# Patient Record
Sex: MALE | Race: WHITE | NOT HISPANIC OR LATINO | Employment: OTHER | URBAN - METROPOLITAN AREA
[De-identification: names, ages, dates, MRNs, and addresses within clinical notes are randomized per-mention and may not be internally consistent; named-entity substitution may affect disease eponyms.]

---

## 2022-03-15 ENCOUNTER — APPOINTMENT (INPATIENT)
Dept: RADIOLOGY | Facility: HOSPITAL | Age: 62
DRG: 481 | End: 2022-03-15
Payer: COMMERCIAL

## 2022-03-15 ENCOUNTER — APPOINTMENT (EMERGENCY)
Dept: RADIOLOGY | Facility: HOSPITAL | Age: 62
DRG: 481 | End: 2022-03-15
Payer: COMMERCIAL

## 2022-03-15 ENCOUNTER — HOSPITAL ENCOUNTER (INPATIENT)
Facility: HOSPITAL | Age: 62
LOS: 7 days | DRG: 481 | End: 2022-03-22
Attending: STUDENT IN AN ORGANIZED HEALTH CARE EDUCATION/TRAINING PROGRAM | Admitting: STUDENT IN AN ORGANIZED HEALTH CARE EDUCATION/TRAINING PROGRAM
Payer: COMMERCIAL

## 2022-03-15 DIAGNOSIS — S72.402A CLOSED FRACTURE OF DISTAL END OF LEFT FEMUR, UNSPECIFIED FRACTURE MORPHOLOGY, INITIAL ENCOUNTER (HCC): ICD-10-CM
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
## 2022-03-15 PROBLEM — S70.01XA HEMATOMA OF RIGHT HIP: Status: ACTIVE | Noted: 2022-03-15

## 2022-03-15 PROBLEM — G89.11 ACUTE PAIN DUE TO TRAUMA: Status: ACTIVE | Noted: 2022-03-15

## 2022-03-15 PROBLEM — S70.02XA HIP HEMATOMA, LEFT: Status: ACTIVE | Noted: 2022-03-15

## 2022-03-15 PROBLEM — G89.29 CHRONIC PAIN: Status: ACTIVE | Noted: 2022-03-15

## 2022-03-15 PROBLEM — S30.1XXA ABDOMINAL WALL CONTUSION: Status: ACTIVE | Noted: 2022-03-15

## 2022-03-15 PROBLEM — T07.XXXA ABRASIONS OF MULTIPLE SITES: Status: ACTIVE | Noted: 2022-03-15

## 2022-03-15 PROBLEM — S72.92XA FEMUR FRACTURE, LEFT (HCC): Status: ACTIVE | Noted: 2022-03-15

## 2022-03-15 PROBLEM — S22.43XA CLOSED FRACTURE OF MULTIPLE RIBS OF BOTH SIDES: Status: ACTIVE | Noted: 2022-03-15

## 2022-03-15 PROBLEM — S22.20XA STERNAL FRACTURE WITH RETROSTERNAL CONTUSION, CLOSED, INITIAL ENCOUNTER: Status: ACTIVE | Noted: 2022-03-15

## 2022-03-15 LAB
4HR DELTA HS TROPONIN: 6 NG/L
ABO GROUP BLD: NORMAL
ABO GROUP BLD: NORMAL
APTT PPP: 26 SECONDS (ref 23–37)
ATRIAL RATE: 118 BPM
BASE EXCESS BLDA CALC-SCNC: 1 MMOL/L (ref -2–3)
BASOPHILS # BLD MANUAL: 0 THOUSAND/UL (ref 0–0.1)
BASOPHILS NFR MAR MANUAL: 0 % (ref 0–1)
BLD GP AB SCN SERPL QL: NEGATIVE
CA-I BLD-SCNC: 1.12 MMOL/L (ref 1.12–1.32)
CARDIAC TROPONIN I PNL SERPL HS: 11 NG/L
CARDIAC TROPONIN I PNL SERPL HS: 5 NG/L
EOSINOPHIL # BLD MANUAL: 0.19 THOUSAND/UL (ref 0–0.4)
EOSINOPHIL NFR BLD MANUAL: 1 % (ref 0–6)
ERYTHROCYTE [DISTWIDTH] IN BLOOD BY AUTOMATED COUNT: 14.1 % (ref 11.6–15.1)
GLUCOSE SERPL-MCNC: 129 MG/DL (ref 65–140)
HCO3 BLDA-SCNC: 26.6 MMOL/L (ref 24–30)
HCT VFR BLD AUTO: 43.4 % (ref 36.5–49.3)
HCT VFR BLD AUTO: 47.3 % (ref 36.5–49.3)
HCT VFR BLD CALC: 47 % (ref 36.5–49.3)
HGB BLD-MCNC: 14.9 G/DL (ref 12–17)
HGB BLD-MCNC: 15.3 G/DL (ref 12–17)
HGB BLDA-MCNC: 16 G/DL (ref 12–17)
HOLD SPECIMEN: NORMAL
INR PPP: 1.03 (ref 0.84–1.19)
LYMPHOCYTES # BLD AUTO: 13 % (ref 14–44)
LYMPHOCYTES # BLD AUTO: 2.5 THOUSAND/UL (ref 0.6–4.47)
MCH RBC QN AUTO: 29.7 PG (ref 26.8–34.3)
MCHC RBC AUTO-ENTMCNC: 32.3 G/DL (ref 31.4–37.4)
MCV RBC AUTO: 92 FL (ref 82–98)
MONOCYTES # BLD AUTO: 0.77 THOUSAND/UL (ref 0–1.22)
MONOCYTES NFR BLD: 4 % (ref 4–12)
NEUTROPHILS # BLD MANUAL: 15.79 THOUSAND/UL (ref 1.85–7.62)
NEUTS BAND NFR BLD MANUAL: 7 % (ref 0–8)
NEUTS SEG NFR BLD AUTO: 75 % (ref 43–75)
P AXIS: 80 DEGREES
PCO2 BLD: 28 MMOL/L (ref 21–32)
PCO2 BLD: 45 MM HG (ref 42–50)
PH BLD: 7.38 [PH] (ref 7.3–7.4)
PLATELET # BLD AUTO: 248 THOUSANDS/UL (ref 149–390)
PLATELET BLD QL SMEAR: ADEQUATE
PMV BLD AUTO: 8.2 FL (ref 8.9–12.7)
PO2 BLD: 19 MM HG (ref 35–45)
POTASSIUM BLD-SCNC: 3.8 MMOL/L (ref 3.5–5.3)
PR INTERVAL: 128 MS
PROTHROMBIN TIME: 13.1 SECONDS (ref 11.6–14.5)
QRS AXIS: 78 DEGREES
QRSD INTERVAL: 88 MS
QT INTERVAL: 330 MS
QTC INTERVAL: 462 MS
RBC # BLD AUTO: 5.15 MILLION/UL (ref 3.88–5.62)
RBC MORPH BLD: NORMAL
RH BLD: POSITIVE
RH BLD: POSITIVE
SAO2 % BLD FROM PO2: 28 % (ref 60–85)
SODIUM BLD-SCNC: 138 MMOL/L (ref 136–145)
SPECIMEN EXPIRATION DATE: NORMAL
SPECIMEN SOURCE: ABNORMAL
T WAVE AXIS: 71 DEGREES
VENTRICULAR RATE: 118 BPM
WBC # BLD AUTO: 19.26 THOUSAND/UL (ref 4.31–10.16)

## 2022-03-15 PROCEDURE — 71045 X-RAY EXAM CHEST 1 VIEW: CPT

## 2022-03-15 PROCEDURE — 93005 ELECTROCARDIOGRAM TRACING: CPT

## 2022-03-15 PROCEDURE — 93308 TTE F-UP OR LMTD: CPT | Performed by: PHYSICIAN ASSISTANT

## 2022-03-15 PROCEDURE — NC001 PR NO CHARGE: Performed by: EMERGENCY MEDICINE

## 2022-03-15 PROCEDURE — 85730 THROMBOPLASTIN TIME PARTIAL: CPT | Performed by: PHYSICIAN ASSISTANT

## 2022-03-15 PROCEDURE — 85018 HEMOGLOBIN: CPT | Performed by: PHYSICIAN ASSISTANT

## 2022-03-15 PROCEDURE — 96375 TX/PRO/DX INJ NEW DRUG ADDON: CPT

## 2022-03-15 PROCEDURE — 73551 X-RAY EXAM OF FEMUR 1: CPT

## 2022-03-15 PROCEDURE — 85610 PROTHROMBIN TIME: CPT | Performed by: PHYSICIAN ASSISTANT

## 2022-03-15 PROCEDURE — 72170 X-RAY EXAM OF PELVIS: CPT

## 2022-03-15 PROCEDURE — 73610 X-RAY EXAM OF ANKLE: CPT

## 2022-03-15 PROCEDURE — 86900 BLOOD TYPING SEROLOGIC ABO: CPT | Performed by: PHYSICIAN ASSISTANT

## 2022-03-15 PROCEDURE — 84295 ASSAY OF SERUM SODIUM: CPT

## 2022-03-15 PROCEDURE — 85007 BL SMEAR W/DIFF WBC COUNT: CPT | Performed by: PHYSICIAN ASSISTANT

## 2022-03-15 PROCEDURE — 74177 CT ABD & PELVIS W/CONTRAST: CPT

## 2022-03-15 PROCEDURE — 85027 COMPLETE CBC AUTOMATED: CPT | Performed by: PHYSICIAN ASSISTANT

## 2022-03-15 PROCEDURE — 90471 IMMUNIZATION ADMIN: CPT

## 2022-03-15 PROCEDURE — G1004 CDSM NDSC: HCPCS

## 2022-03-15 PROCEDURE — 70450 CT HEAD/BRAIN W/O DYE: CPT

## 2022-03-15 PROCEDURE — 90715 TDAP VACCINE 7 YRS/> IM: CPT | Performed by: STUDENT IN AN ORGANIZED HEALTH CARE EDUCATION/TRAINING PROGRAM

## 2022-03-15 PROCEDURE — 76604 US EXAM CHEST: CPT | Performed by: PHYSICIAN ASSISTANT

## 2022-03-15 PROCEDURE — 93010 ELECTROCARDIOGRAM REPORT: CPT | Performed by: INTERNAL MEDICINE

## 2022-03-15 PROCEDURE — 99223 1ST HOSP IP/OBS HIGH 75: CPT | Performed by: ORTHOPAEDIC SURGERY

## 2022-03-15 PROCEDURE — 99223 1ST HOSP IP/OBS HIGH 75: CPT | Performed by: STUDENT IN AN ORGANIZED HEALTH CARE EDUCATION/TRAINING PROGRAM

## 2022-03-15 PROCEDURE — 82330 ASSAY OF CALCIUM: CPT

## 2022-03-15 PROCEDURE — 86850 RBC ANTIBODY SCREEN: CPT | Performed by: PHYSICIAN ASSISTANT

## 2022-03-15 PROCEDURE — 73552 X-RAY EXAM OF FEMUR 2/>: CPT

## 2022-03-15 PROCEDURE — 84132 ASSAY OF SERUM POTASSIUM: CPT

## 2022-03-15 PROCEDURE — 82803 BLOOD GASES ANY COMBINATION: CPT

## 2022-03-15 PROCEDURE — 99285 EMERGENCY DEPT VISIT HI MDM: CPT

## 2022-03-15 PROCEDURE — 73590 X-RAY EXAM OF LOWER LEG: CPT

## 2022-03-15 PROCEDURE — 85014 HEMATOCRIT: CPT

## 2022-03-15 PROCEDURE — 96374 THER/PROPH/DIAG INJ IV PUSH: CPT

## 2022-03-15 PROCEDURE — 76705 ECHO EXAM OF ABDOMEN: CPT | Performed by: PHYSICIAN ASSISTANT

## 2022-03-15 PROCEDURE — 84484 ASSAY OF TROPONIN QUANT: CPT | Performed by: PHYSICIAN ASSISTANT

## 2022-03-15 PROCEDURE — 82947 ASSAY GLUCOSE BLOOD QUANT: CPT

## 2022-03-15 PROCEDURE — 85014 HEMATOCRIT: CPT | Performed by: PHYSICIAN ASSISTANT

## 2022-03-15 PROCEDURE — 71260 CT THORAX DX C+: CPT

## 2022-03-15 PROCEDURE — 72125 CT NECK SPINE W/O DYE: CPT

## 2022-03-15 PROCEDURE — 36415 COLL VENOUS BLD VENIPUNCTURE: CPT

## 2022-03-15 PROCEDURE — 86901 BLOOD TYPING SEROLOGIC RH(D): CPT | Performed by: PHYSICIAN ASSISTANT

## 2022-03-15 PROCEDURE — 73700 CT LOWER EXTREMITY W/O DYE: CPT

## 2022-03-15 RX ORDER — LIDOCAINE 50 MG/G
1 PATCH TOPICAL DAILY
Status: COMPLETED | OUTPATIENT
Start: 2022-03-15 | End: 2022-03-16

## 2022-03-15 RX ORDER — BISACODYL 10 MG
10 SUPPOSITORY, RECTAL RECTAL DAILY PRN
Status: DISCONTINUED | OUTPATIENT
Start: 2022-03-15 | End: 2022-03-22 | Stop reason: HOSPADM

## 2022-03-15 RX ORDER — AMOXICILLIN 250 MG
2 CAPSULE ORAL DAILY
Status: DISCONTINUED | OUTPATIENT
Start: 2022-03-15 | End: 2022-03-22 | Stop reason: HOSPADM

## 2022-03-15 RX ORDER — OXYCODONE HYDROCHLORIDE 10 MG/1
10 TABLET ORAL EVERY 4 HOURS PRN
Status: DISCONTINUED | OUTPATIENT
Start: 2022-03-15 | End: 2022-03-16

## 2022-03-15 RX ORDER — OXYCODONE AND ACETAMINOPHEN 10; 325 MG/1; MG/1
1 TABLET ORAL EVERY 6 HOURS PRN
Status: ON HOLD | COMMUNITY
End: 2022-03-29 | Stop reason: SDUPTHER

## 2022-03-15 RX ORDER — POLYETHYLENE GLYCOL 3350 17 G/17G
17 POWDER, FOR SOLUTION ORAL DAILY
Status: DISCONTINUED | OUTPATIENT
Start: 2022-03-15 | End: 2022-03-22 | Stop reason: HOSPADM

## 2022-03-15 RX ORDER — METHOCARBAMOL 500 MG/1
500 TABLET, FILM COATED ORAL EVERY 6 HOURS SCHEDULED
Status: DISCONTINUED | OUTPATIENT
Start: 2022-03-15 | End: 2022-03-18

## 2022-03-15 RX ORDER — SODIUM CHLORIDE, SODIUM GLUCONATE, SODIUM ACETATE, POTASSIUM CHLORIDE, MAGNESIUM CHLORIDE, SODIUM PHOSPHATE, DIBASIC, AND POTASSIUM PHOSPHATE .53; .5; .37; .037; .03; .012; .00082 G/100ML; G/100ML; G/100ML; G/100ML; G/100ML; G/100ML; G/100ML
125 INJECTION, SOLUTION INTRAVENOUS CONTINUOUS
Status: CANCELLED | OUTPATIENT
Start: 2022-03-15

## 2022-03-15 RX ORDER — FENTANYL CITRATE 50 UG/ML
INJECTION, SOLUTION INTRAMUSCULAR; INTRAVENOUS CODE/TRAUMA/SEDATION MEDICATION
Status: COMPLETED | OUTPATIENT
Start: 2022-03-15 | End: 2022-03-15

## 2022-03-15 RX ORDER — LEVOTHYROXINE SODIUM 0.15 MG/1
150 TABLET ORAL
COMMUNITY

## 2022-03-15 RX ORDER — HYDROMORPHONE HCL/PF 1 MG/ML
0.5 SYRINGE (ML) INJECTION
Status: DISCONTINUED | OUTPATIENT
Start: 2022-03-15 | End: 2022-03-16

## 2022-03-15 RX ORDER — CEFAZOLIN SODIUM 2 G/50ML
2000 SOLUTION INTRAVENOUS EVERY 8 HOURS
Status: DISCONTINUED | OUTPATIENT
Start: 2022-03-15 | End: 2022-03-15

## 2022-03-15 RX ORDER — OXYCODONE HYDROCHLORIDE 5 MG/1
5 TABLET ORAL EVERY 4 HOURS PRN
Status: DISCONTINUED | OUTPATIENT
Start: 2022-03-15 | End: 2022-03-16

## 2022-03-15 RX ORDER — GABAPENTIN 100 MG/1
100 CAPSULE ORAL 3 TIMES DAILY
Status: DISCONTINUED | OUTPATIENT
Start: 2022-03-15 | End: 2022-03-22 | Stop reason: HOSPADM

## 2022-03-15 RX ORDER — ACETAMINOPHEN 325 MG/1
975 TABLET ORAL EVERY 8 HOURS SCHEDULED
Status: DISCONTINUED | OUTPATIENT
Start: 2022-03-15 | End: 2022-03-22 | Stop reason: HOSPADM

## 2022-03-15 RX ORDER — ONDANSETRON 2 MG/ML
4 INJECTION INTRAMUSCULAR; INTRAVENOUS EVERY 4 HOURS PRN
Status: DISCONTINUED | OUTPATIENT
Start: 2022-03-15 | End: 2022-03-22 | Stop reason: HOSPADM

## 2022-03-15 RX ORDER — SODIUM CHLORIDE, SODIUM LACTATE, POTASSIUM CHLORIDE, CALCIUM CHLORIDE 600; 310; 30; 20 MG/100ML; MG/100ML; MG/100ML; MG/100ML
75 INJECTION, SOLUTION INTRAVENOUS CONTINUOUS
Status: DISCONTINUED | OUTPATIENT
Start: 2022-03-16 | End: 2022-03-16

## 2022-03-15 RX ORDER — LEVOTHYROXINE SODIUM 0.07 MG/1
150 TABLET ORAL
Status: DISCONTINUED | OUTPATIENT
Start: 2022-03-16 | End: 2022-03-22 | Stop reason: HOSPADM

## 2022-03-15 RX ORDER — NICOTINE 21 MG/24HR
21 PATCH, TRANSDERMAL 24 HOURS TRANSDERMAL DAILY
Status: DISCONTINUED | OUTPATIENT
Start: 2022-03-15 | End: 2022-03-16

## 2022-03-15 RX ORDER — NICOTINE 21 MG/24HR
21 PATCH, TRANSDERMAL 24 HOURS TRANSDERMAL DAILY
Status: DISCONTINUED | OUTPATIENT
Start: 2022-03-16 | End: 2022-03-15

## 2022-03-15 RX ADMIN — OXYCODONE HYDROCHLORIDE 10 MG: 10 TABLET ORAL at 17:36

## 2022-03-15 RX ADMIN — METHOCARBAMOL 500 MG: 500 TABLET ORAL at 13:51

## 2022-03-15 RX ADMIN — FENTANYL CITRATE 50 MCG: 50 INJECTION INTRAMUSCULAR; INTRAVENOUS at 12:14

## 2022-03-15 RX ADMIN — LIDOCAINE 5% 1 PATCH: 700 PATCH TOPICAL at 13:52

## 2022-03-15 RX ADMIN — IOHEXOL 100 ML: 350 INJECTION, SOLUTION INTRAVENOUS at 12:44

## 2022-03-15 RX ADMIN — NICOTINE 21 MG: 21 PATCH, EXTENDED RELEASE TRANSDERMAL at 18:38

## 2022-03-15 RX ADMIN — HYDROMORPHONE HYDROCHLORIDE 0.5 MG: 1 INJECTION, SOLUTION INTRAMUSCULAR; INTRAVENOUS; SUBCUTANEOUS at 12:56

## 2022-03-15 RX ADMIN — TETANUS TOXOID, REDUCED DIPHTHERIA TOXOID AND ACELLULAR PERTUSSIS VACCINE, ADSORBED 0.5 ML: 5; 2.5; 8; 8; 2.5 SUSPENSION INTRAMUSCULAR at 12:36

## 2022-03-15 RX ADMIN — HYDROMORPHONE HYDROCHLORIDE 0.5 MG: 1 INJECTION, SOLUTION INTRAMUSCULAR; INTRAVENOUS; SUBCUTANEOUS at 14:04

## 2022-03-15 RX ADMIN — ACETAMINOPHEN 975 MG: 325 TABLET ORAL at 13:51

## 2022-03-15 RX ADMIN — GABAPENTIN 100 MG: 100 CAPSULE ORAL at 21:36

## 2022-03-15 RX ADMIN — CEFAZOLIN SODIUM 2000 MG: 2 SOLUTION INTRAVENOUS at 13:51

## 2022-03-15 RX ADMIN — OXYCODONE HYDROCHLORIDE 10 MG: 10 TABLET ORAL at 21:36
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
## 2022-03-15 NOTE — ASSESSMENT & PLAN NOTE
- Abrasions of multiple sites including the lower anterior chest wall, lower abdominal wall, and left lower extremity   - Local wound care as indicated  - Tetanus vaccination status updated  - Analgesia as needed

## 2022-03-15 NOTE — QUICK NOTE
Cervical Collar Clearance: The patient had a CT scan of the cervical spine demonstrating no acute injury  On exam, the patient had no midline point tenderness or paresthesias/numbness/weakness in the extremities  The patient had full range of motion (was then able to flex, extend, and rotate head laterally) without pain  There were no distracting injuries and the patient was not intoxicated  The patient's cervical spine was cleared radiologically and clinically  Cervical collar had already been removed prior to my re-evaluation and it is suspected that the patient self-discontinued it      Alona Boudreaux PA-C  3/15/2022 02:31 PM
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
## 2022-03-15 NOTE — ASSESSMENT & PLAN NOTE
- Acute left hip hematoma with active extravasation, present on presentation   - Monitor physical exam   - Apply pelvic binder   - Monitor serial H&H every 6 hours  - Hold chemical DVT prophylaxis until no evidence of active or ongoing bleeding established  - Initiate multimodal analgesic regimen   - Transfusion only as indicated

## 2022-03-15 NOTE — ASSESSMENT & PLAN NOTE
- Acute left hip hematoma, present on presentation   - Monitor physical exam   - Apply pelvic binder   - Monitor serial H&H every 6 hours  - Hold chemical DVT prophylaxis until no evidence of active or ongoing bleeding established  - Initiate multimodal analgesic regimen   - Transfusion only as indicated
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
## 2022-03-15 NOTE — ASSESSMENT & PLAN NOTE
- Acute comminuted left distal femur fracture, present on admission   - Appreciate Orthopedic surgery evaluation and recommendations  Anticipate operative intervention on 03/16/2022   - Maintain non-weightbearing status on the left lower extremity   - Monitor left lower extremity neurovascular exam   - Continue multimodal analgesic regimen   - Continue DVT prophylaxis  - PT and OT evaluation and treatment as indicated  - Outpatient follow up with Orthopedic surgery for re-evaluation
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
## 2022-03-16 ENCOUNTER — APPOINTMENT (INPATIENT)
Dept: NON INVASIVE DIAGNOSTICS | Facility: HOSPITAL | Age: 62
DRG: 481 | End: 2022-03-16
Payer: COMMERCIAL

## 2022-03-16 ENCOUNTER — ANESTHESIA (INPATIENT)
Dept: PERIOP | Facility: HOSPITAL | Age: 62
DRG: 481 | End: 2022-03-16
Payer: COMMERCIAL

## 2022-03-16 ENCOUNTER — APPOINTMENT (INPATIENT)
Dept: RADIOLOGY | Facility: HOSPITAL | Age: 62
DRG: 481 | End: 2022-03-16
Payer: COMMERCIAL

## 2022-03-16 ENCOUNTER — ANESTHESIA EVENT (INPATIENT)
Dept: PERIOP | Facility: HOSPITAL | Age: 62
DRG: 481 | End: 2022-03-16
Payer: COMMERCIAL

## 2022-03-16 ENCOUNTER — APPOINTMENT (INPATIENT)
Dept: RADIOLOGY | Facility: HOSPITAL | Age: 62
DRG: 481 | End: 2022-03-16
Attending: EMERGENCY MEDICINE
Payer: COMMERCIAL

## 2022-03-16 PROBLEM — M77.30 HEEL SPUR: Status: ACTIVE | Noted: 2022-03-16

## 2022-03-16 LAB
2HR DELTA HS TROPONIN: 7 NG/L
ALBUMIN SERPL BCP-MCNC: 3.4 G/DL (ref 3.5–5)
ALP SERPL-CCNC: 63 U/L (ref 46–116)
ALT SERPL W P-5'-P-CCNC: 39 U/L (ref 12–78)
ANION GAP SERPL CALCULATED.3IONS-SCNC: 10 MMOL/L (ref 4–13)
ANION GAP SERPL CALCULATED.3IONS-SCNC: 8 MMOL/L (ref 4–13)
ANION GAP SERPL CALCULATED.3IONS-SCNC: 9 MMOL/L (ref 4–13)
AST SERPL W P-5'-P-CCNC: 64 U/L (ref 5–45)
BASOPHILS # BLD AUTO: 0.03 THOUSANDS/ΜL (ref 0–0.1)
BASOPHILS NFR BLD AUTO: 0 % (ref 0–1)
BILIRUB SERPL-MCNC: 0.76 MG/DL (ref 0.2–1)
BUN SERPL-MCNC: 13 MG/DL (ref 5–25)
BUN SERPL-MCNC: 14 MG/DL (ref 5–25)
BUN SERPL-MCNC: 15 MG/DL (ref 5–25)
CALCIUM ALBUM COR SERPL-MCNC: 8.7 MG/DL (ref 8.3–10.1)
CALCIUM SERPL-MCNC: 8.2 MG/DL (ref 8.3–10.1)
CALCIUM SERPL-MCNC: 8.4 MG/DL (ref 8.3–10.1)
CALCIUM SERPL-MCNC: 9 MG/DL (ref 8.3–10.1)
CARDIAC TROPONIN I PNL SERPL HS: 12 NG/L
CHLORIDE SERPL-SCNC: 103 MMOL/L (ref 100–108)
CHLORIDE SERPL-SCNC: 103 MMOL/L (ref 100–108)
CHLORIDE SERPL-SCNC: 105 MMOL/L (ref 100–108)
CO2 SERPL-SCNC: 21 MMOL/L (ref 21–32)
CO2 SERPL-SCNC: 23 MMOL/L (ref 21–32)
CO2 SERPL-SCNC: 24 MMOL/L (ref 21–32)
CREAT SERPL-MCNC: 0.78 MG/DL (ref 0.6–1.3)
CREAT SERPL-MCNC: 0.83 MG/DL (ref 0.6–1.3)
CREAT SERPL-MCNC: 0.9 MG/DL (ref 0.6–1.3)
EOSINOPHIL # BLD AUTO: 0 THOUSAND/ΜL (ref 0–0.61)
EOSINOPHIL NFR BLD AUTO: 0 % (ref 0–6)
ERYTHROCYTE [DISTWIDTH] IN BLOOD BY AUTOMATED COUNT: 13.9 % (ref 11.6–15.1)
ERYTHROCYTE [DISTWIDTH] IN BLOOD BY AUTOMATED COUNT: 14 % (ref 11.6–15.1)
GFR SERPL CREATININE-BSD FRML MDRD: 91 ML/MIN/1.73SQ M
GFR SERPL CREATININE-BSD FRML MDRD: 94 ML/MIN/1.73SQ M
GFR SERPL CREATININE-BSD FRML MDRD: 97 ML/MIN/1.73SQ M
GLUCOSE SERPL-MCNC: 128 MG/DL (ref 65–140)
GLUCOSE SERPL-MCNC: 148 MG/DL (ref 65–140)
GLUCOSE SERPL-MCNC: 152 MG/DL (ref 65–140)
GLUCOSE SERPL-MCNC: 153 MG/DL (ref 65–140)
HCT VFR BLD AUTO: 31.3 % (ref 36.5–49.3)
HCT VFR BLD AUTO: 32.1 % (ref 36.5–49.3)
HCT VFR BLD AUTO: 41.2 % (ref 36.5–49.3)
HCT VFR BLD AUTO: 41.3 % (ref 36.5–49.3)
HGB BLD-MCNC: 10.3 G/DL (ref 12–17)
HGB BLD-MCNC: 10.5 G/DL (ref 12–17)
HGB BLD-MCNC: 13.4 G/DL (ref 12–17)
HGB BLD-MCNC: 13.9 G/DL (ref 12–17)
IMM GRANULOCYTES # BLD AUTO: 0.1 THOUSAND/UL (ref 0–0.2)
IMM GRANULOCYTES NFR BLD AUTO: 1 % (ref 0–2)
LYMPHOCYTES # BLD AUTO: 1.14 THOUSANDS/ΜL (ref 0.6–4.47)
LYMPHOCYTES NFR BLD AUTO: 7 % (ref 14–44)
MAGNESIUM SERPL-MCNC: 2 MG/DL (ref 1.6–2.6)
MCH RBC QN AUTO: 29.3 PG (ref 26.8–34.3)
MCH RBC QN AUTO: 29.7 PG (ref 26.8–34.3)
MCHC RBC AUTO-ENTMCNC: 32.5 G/DL (ref 31.4–37.4)
MCHC RBC AUTO-ENTMCNC: 32.7 G/DL (ref 31.4–37.4)
MCV RBC AUTO: 90 FL (ref 82–98)
MCV RBC AUTO: 91 FL (ref 82–98)
MONOCYTES # BLD AUTO: 0.63 THOUSAND/ΜL (ref 0.17–1.22)
MONOCYTES NFR BLD AUTO: 4 % (ref 4–12)
NEUTROPHILS # BLD AUTO: 15.44 THOUSANDS/ΜL (ref 1.85–7.62)
NEUTS SEG NFR BLD AUTO: 88 % (ref 43–75)
NRBC BLD AUTO-RTO: 0 /100 WBCS
PHOSPHATE SERPL-MCNC: 2 MG/DL (ref 2.3–4.1)
PLATELET # BLD AUTO: 194 THOUSANDS/UL (ref 149–390)
PLATELET # BLD AUTO: 215 THOUSANDS/UL (ref 149–390)
PMV BLD AUTO: 9 FL (ref 8.9–12.7)
PMV BLD AUTO: 9.3 FL (ref 8.9–12.7)
POTASSIUM SERPL-SCNC: 4 MMOL/L (ref 3.5–5.3)
POTASSIUM SERPL-SCNC: 4 MMOL/L (ref 3.5–5.3)
POTASSIUM SERPL-SCNC: 4.1 MMOL/L (ref 3.5–5.3)
PROT SERPL-MCNC: 7 G/DL (ref 6.4–8.2)
RBC # BLD AUTO: 3.54 MILLION/UL (ref 3.88–5.62)
RBC # BLD AUTO: 4.58 MILLION/UL (ref 3.88–5.62)
SODIUM SERPL-SCNC: 134 MMOL/L (ref 136–145)
SODIUM SERPL-SCNC: 136 MMOL/L (ref 136–145)
SODIUM SERPL-SCNC: 136 MMOL/L (ref 136–145)
WBC # BLD AUTO: 14.02 THOUSAND/UL (ref 4.31–10.16)
WBC # BLD AUTO: 17.34 THOUSAND/UL (ref 4.31–10.16)

## 2022-03-16 PROCEDURE — NC001 PR NO CHARGE: Performed by: STUDENT IN AN ORGANIZED HEALTH CARE EDUCATION/TRAINING PROGRAM

## 2022-03-16 PROCEDURE — 85025 COMPLETE CBC W/AUTO DIFF WBC: CPT | Performed by: EMERGENCY MEDICINE

## 2022-03-16 PROCEDURE — C1713 ANCHOR/SCREW BN/BN,TIS/BN: HCPCS | Performed by: ORTHOPAEDIC SURGERY

## 2022-03-16 PROCEDURE — 80048 BASIC METABOLIC PNL TOTAL CA: CPT | Performed by: STUDENT IN AN ORGANIZED HEALTH CARE EDUCATION/TRAINING PROGRAM

## 2022-03-16 PROCEDURE — 80053 COMPREHEN METABOLIC PANEL: CPT | Performed by: EMERGENCY MEDICINE

## 2022-03-16 PROCEDURE — 85018 HEMOGLOBIN: CPT | Performed by: ORTHOPAEDIC SURGERY

## 2022-03-16 PROCEDURE — 82948 REAGENT STRIP/BLOOD GLUCOSE: CPT

## 2022-03-16 PROCEDURE — C1769 GUIDE WIRE: HCPCS | Performed by: ORTHOPAEDIC SURGERY

## 2022-03-16 PROCEDURE — 84100 ASSAY OF PHOSPHORUS: CPT | Performed by: STUDENT IN AN ORGANIZED HEALTH CARE EDUCATION/TRAINING PROGRAM

## 2022-03-16 PROCEDURE — 71045 X-RAY EXAM CHEST 1 VIEW: CPT

## 2022-03-16 PROCEDURE — 85027 COMPLETE CBC AUTOMATED: CPT | Performed by: PHYSICIAN ASSISTANT

## 2022-03-16 PROCEDURE — NC001 PR NO CHARGE: Performed by: ORTHOPAEDIC SURGERY

## 2022-03-16 PROCEDURE — 0QSC06Z REPOSITION LEFT LOWER FEMUR WITH INTRAMEDULLARY INTERNAL FIXATION DEVICE, OPEN APPROACH: ICD-10-PCS | Performed by: ORTHOPAEDIC SURGERY

## 2022-03-16 PROCEDURE — 99233 SBSQ HOSP IP/OBS HIGH 50: CPT | Performed by: STUDENT IN AN ORGANIZED HEALTH CARE EDUCATION/TRAINING PROGRAM

## 2022-03-16 PROCEDURE — 80048 BASIC METABOLIC PNL TOTAL CA: CPT | Performed by: PHYSICIAN ASSISTANT

## 2022-03-16 PROCEDURE — 83735 ASSAY OF MAGNESIUM: CPT | Performed by: STUDENT IN AN ORGANIZED HEALTH CARE EDUCATION/TRAINING PROGRAM

## 2022-03-16 PROCEDURE — C8929 TTE W OR WO FOL WCON,DOPPLER: HCPCS

## 2022-03-16 PROCEDURE — 93306 TTE W/DOPPLER COMPLETE: CPT | Performed by: INTERNAL MEDICINE

## 2022-03-16 PROCEDURE — 73552 X-RAY EXAM OF FEMUR 2/>: CPT

## 2022-03-16 PROCEDURE — 93005 ELECTROCARDIOGRAM TRACING: CPT

## 2022-03-16 PROCEDURE — 27513 TREATMENT OF THIGH FRACTURE: CPT | Performed by: ORTHOPAEDIC SURGERY

## 2022-03-16 PROCEDURE — 84484 ASSAY OF TROPONIN QUANT: CPT | Performed by: STUDENT IN AN ORGANIZED HEALTH CARE EDUCATION/TRAINING PROGRAM

## 2022-03-16 PROCEDURE — 85014 HEMATOCRIT: CPT | Performed by: ORTHOPAEDIC SURGERY

## 2022-03-16 DEVICE — 5.0MM LOCKING SCREW SLF-TPNG WITH T25 STARDRIVE RECESS 40MM: Type: IMPLANTABLE DEVICE | Site: LEG | Status: FUNCTIONAL

## 2022-03-16 DEVICE — 5.0MM CANNULATED CONICAL SCREW 90MM: Type: IMPLANTABLE DEVICE | Site: LEG | Status: FUNCTIONAL

## 2022-03-16 DEVICE — 4.5MM CORTEX SCREW SELF-TAPPING 40MM: Type: IMPLANTABLE DEVICE | Site: LEG | Status: FUNCTIONAL

## 2022-03-16 DEVICE — 4.5MM CANNULATED SCREW PARTIALLY THREADED/56MM: Type: IMPLANTABLE DEVICE | Site: LEG | Status: FUNCTIONAL

## 2022-03-16 DEVICE — 4.5MM LCP® CURVED CONDYLAR PLATE 12 HOLES/278MM-LEFT
Type: IMPLANTABLE DEVICE | Site: LEG | Status: FUNCTIONAL
Brand: LCP

## 2022-03-16 DEVICE — 4.5MM CORTEX SCREW SELF-TAPPING 20MM: Type: IMPLANTABLE DEVICE | Site: LEG | Status: FUNCTIONAL

## 2022-03-16 DEVICE — 5.0MM CANNULATED LOCKING SCREW 60MM: Type: IMPLANTABLE DEVICE | Site: LEG | Status: FUNCTIONAL

## 2022-03-16 DEVICE — 5.0MM CANNULATED LOCKING SCREW 80MM: Type: IMPLANTABLE DEVICE | Site: LEG | Status: FUNCTIONAL

## 2022-03-16 DEVICE — 7.3MM CANNULATED LOCKING SCREW 70MM: Type: IMPLANTABLE DEVICE | Site: LEG | Status: FUNCTIONAL

## 2022-03-16 DEVICE — 5.0MM CANNULATED LOCKING SCREW 85MM: Type: IMPLANTABLE DEVICE | Site: LEG | Status: FUNCTIONAL

## 2022-03-16 DEVICE — 5.0MM CANNULATED LOCKING SCREW 75MM: Type: IMPLANTABLE DEVICE | Site: LEG | Status: FUNCTIONAL

## 2022-03-16 DEVICE — 4.5MM CORTEX SCREW SELF-TAPPING 52MM: Type: IMPLANTABLE DEVICE | Site: LEG | Status: FUNCTIONAL

## 2022-03-16 RX ORDER — SODIUM CHLORIDE, SODIUM LACTATE, POTASSIUM CHLORIDE, CALCIUM CHLORIDE 600; 310; 30; 20 MG/100ML; MG/100ML; MG/100ML; MG/100ML
75 INJECTION, SOLUTION INTRAVENOUS CONTINUOUS
Status: DISCONTINUED | OUTPATIENT
Start: 2022-03-16 | End: 2022-03-16

## 2022-03-16 RX ORDER — DEXAMETHASONE SODIUM PHOSPHATE 10 MG/ML
INJECTION, SOLUTION INTRAMUSCULAR; INTRAVENOUS AS NEEDED
Status: DISCONTINUED | OUTPATIENT
Start: 2022-03-16 | End: 2022-03-16

## 2022-03-16 RX ORDER — ALBUMIN, HUMAN INJ 5% 5 %
SOLUTION INTRAVENOUS CONTINUOUS PRN
Status: DISCONTINUED | OUTPATIENT
Start: 2022-03-16 | End: 2022-03-16

## 2022-03-16 RX ORDER — GLYCOPYRROLATE 0.2 MG/ML
INJECTION INTRAMUSCULAR; INTRAVENOUS AS NEEDED
Status: DISCONTINUED | OUTPATIENT
Start: 2022-03-16 | End: 2022-03-16

## 2022-03-16 RX ORDER — PROPOFOL 10 MG/ML
INJECTION, EMULSION INTRAVENOUS AS NEEDED
Status: DISCONTINUED | OUTPATIENT
Start: 2022-03-16 | End: 2022-03-16

## 2022-03-16 RX ORDER — CEFAZOLIN SODIUM 2 G/50ML
2000 SOLUTION INTRAVENOUS EVERY 8 HOURS
Status: COMPLETED | OUTPATIENT
Start: 2022-03-16 | End: 2022-03-17

## 2022-03-16 RX ORDER — NEOSTIGMINE METHYLSULFATE 1 MG/ML
INJECTION INTRAVENOUS AS NEEDED
Status: DISCONTINUED | OUTPATIENT
Start: 2022-03-16 | End: 2022-03-16

## 2022-03-16 RX ORDER — ONDANSETRON 2 MG/ML
4 INJECTION INTRAMUSCULAR; INTRAVENOUS ONCE AS NEEDED
Status: DISCONTINUED | OUTPATIENT
Start: 2022-03-16 | End: 2022-03-16 | Stop reason: HOSPADM

## 2022-03-16 RX ORDER — HYDROMORPHONE HCL/PF 1 MG/ML
SYRINGE (ML) INJECTION AS NEEDED
Status: DISCONTINUED | OUTPATIENT
Start: 2022-03-16 | End: 2022-03-16

## 2022-03-16 RX ORDER — LORATADINE AND PSEUDOEPHEDRINE 10; 240 MG/1; MG/1
1 TABLET, EXTENDED RELEASE ORAL DAILY
Status: DISCONTINUED | OUTPATIENT
Start: 2022-03-16 | End: 2022-03-22 | Stop reason: HOSPADM

## 2022-03-16 RX ORDER — MIDAZOLAM HYDROCHLORIDE 2 MG/2ML
INJECTION, SOLUTION INTRAMUSCULAR; INTRAVENOUS AS NEEDED
Status: DISCONTINUED | OUTPATIENT
Start: 2022-03-16 | End: 2022-03-16

## 2022-03-16 RX ORDER — SODIUM CHLORIDE, SODIUM LACTATE, POTASSIUM CHLORIDE, CALCIUM CHLORIDE 600; 310; 30; 20 MG/100ML; MG/100ML; MG/100ML; MG/100ML
INJECTION, SOLUTION INTRAVENOUS CONTINUOUS PRN
Status: DISCONTINUED | OUTPATIENT
Start: 2022-03-16 | End: 2022-03-16

## 2022-03-16 RX ORDER — LORATADINE AND PSEUDOEPHEDRINE 10; 240 MG/1; MG/1
1 TABLET, EXTENDED RELEASE ORAL DAILY
Status: DISCONTINUED | OUTPATIENT
Start: 2022-03-16 | End: 2022-03-16

## 2022-03-16 RX ORDER — GINSENG 100 MG
1 CAPSULE ORAL 2 TIMES DAILY
Status: DISCONTINUED | OUTPATIENT
Start: 2022-03-16 | End: 2022-03-16

## 2022-03-16 RX ORDER — HYDROMORPHONE HCL/PF 1 MG/ML
1 SYRINGE (ML) INJECTION
Status: DISCONTINUED | OUTPATIENT
Start: 2022-03-16 | End: 2022-03-19

## 2022-03-16 RX ORDER — KETAMINE HYDROCHLORIDE 50 MG/ML
INJECTION, SOLUTION, CONCENTRATE INTRAMUSCULAR; INTRAVENOUS AS NEEDED
Status: DISCONTINUED | OUTPATIENT
Start: 2022-03-16 | End: 2022-03-16

## 2022-03-16 RX ORDER — METOPROLOL TARTRATE 5 MG/5ML
5 INJECTION INTRAVENOUS ONCE
Status: COMPLETED | OUTPATIENT
Start: 2022-03-16 | End: 2022-03-16

## 2022-03-16 RX ORDER — LIDOCAINE HYDROCHLORIDE 20 MG/ML
INJECTION, SOLUTION EPIDURAL; INFILTRATION; INTRACAUDAL; PERINEURAL AS NEEDED
Status: DISCONTINUED | OUTPATIENT
Start: 2022-03-16 | End: 2022-03-16

## 2022-03-16 RX ORDER — BUPIVACAINE HYDROCHLORIDE 2.5 MG/ML
INJECTION, SOLUTION EPIDURAL; INFILTRATION; INTRACAUDAL
Status: COMPLETED | OUTPATIENT
Start: 2022-03-16 | End: 2022-03-16

## 2022-03-16 RX ORDER — CEFAZOLIN SODIUM 2 G/50ML
2000 SOLUTION INTRAVENOUS
Status: COMPLETED | OUTPATIENT
Start: 2022-03-16 | End: 2022-03-16

## 2022-03-16 RX ORDER — SUCCINYLCHOLINE/SOD CL,ISO/PF 100 MG/5ML
SYRINGE (ML) INTRAVENOUS AS NEEDED
Status: DISCONTINUED | OUTPATIENT
Start: 2022-03-16 | End: 2022-03-16

## 2022-03-16 RX ORDER — OXYCODONE HYDROCHLORIDE 5 MG/1
7.5 TABLET ORAL EVERY 4 HOURS PRN
Status: DISCONTINUED | OUTPATIENT
Start: 2022-03-16 | End: 2022-03-19

## 2022-03-16 RX ORDER — MAGNESIUM HYDROXIDE 1200 MG/15ML
LIQUID ORAL AS NEEDED
Status: DISCONTINUED | OUTPATIENT
Start: 2022-03-16 | End: 2022-03-16 | Stop reason: HOSPADM

## 2022-03-16 RX ORDER — NICOTINE 21 MG/24HR
21 PATCH, TRANSDERMAL 24 HOURS TRANSDERMAL DAILY
Status: DISCONTINUED | OUTPATIENT
Start: 2022-03-16 | End: 2022-03-22 | Stop reason: HOSPADM

## 2022-03-16 RX ORDER — FENTANYL CITRATE 50 UG/ML
INJECTION, SOLUTION INTRAMUSCULAR; INTRAVENOUS AS NEEDED
Status: DISCONTINUED | OUTPATIENT
Start: 2022-03-16 | End: 2022-03-16

## 2022-03-16 RX ORDER — ROCURONIUM BROMIDE 10 MG/ML
INJECTION, SOLUTION INTRAVENOUS AS NEEDED
Status: DISCONTINUED | OUTPATIENT
Start: 2022-03-16 | End: 2022-03-16

## 2022-03-16 RX ORDER — ONDANSETRON 2 MG/ML
INJECTION INTRAMUSCULAR; INTRAVENOUS AS NEEDED
Status: DISCONTINUED | OUTPATIENT
Start: 2022-03-16 | End: 2022-03-16

## 2022-03-16 RX ORDER — HYDROMORPHONE HCL/PF 1 MG/ML
1 SYRINGE (ML) INJECTION
Status: DISCONTINUED | OUTPATIENT
Start: 2022-03-16 | End: 2022-03-16

## 2022-03-16 RX ORDER — FENTANYL CITRATE/PF 50 MCG/ML
25 SYRINGE (ML) INJECTION
Status: DISCONTINUED | OUTPATIENT
Start: 2022-03-16 | End: 2022-03-16 | Stop reason: HOSPADM

## 2022-03-16 RX ORDER — EPHEDRINE SULFATE 50 MG/ML
INJECTION INTRAVENOUS AS NEEDED
Status: DISCONTINUED | OUTPATIENT
Start: 2022-03-16 | End: 2022-03-16

## 2022-03-16 RX ORDER — HYDROMORPHONE HCL/PF 1 MG/ML
0.5 SYRINGE (ML) INJECTION
Status: DISCONTINUED | OUTPATIENT
Start: 2022-03-16 | End: 2022-03-16 | Stop reason: HOSPADM

## 2022-03-16 RX ADMIN — HYDROMORPHONE HYDROCHLORIDE 0.5 MG: 1 INJECTION, SOLUTION INTRAMUSCULAR; INTRAVENOUS; SUBCUTANEOUS at 08:03

## 2022-03-16 RX ADMIN — PERFLUTREN 0.6 ML/MIN: 6.52 INJECTION, SUSPENSION INTRAVENOUS at 16:30

## 2022-03-16 RX ADMIN — PROPOFOL 200 MG: 10 INJECTION, EMULSION INTRAVENOUS at 10:34

## 2022-03-16 RX ADMIN — NICOTINE 21 MG: 21 PATCH, EXTENDED RELEASE TRANSDERMAL at 08:01

## 2022-03-16 RX ADMIN — EPHEDRINE SULFATE 10 MG: 50 INJECTION INTRAVENOUS at 10:40

## 2022-03-16 RX ADMIN — MORPHINE SULFATE 105 MG: 15 TABLET, FILM COATED, EXTENDED RELEASE ORAL at 22:27

## 2022-03-16 RX ADMIN — LORATADINE AND PSEUDOEPHEDRINE 1 TABLET: 10; 240 TABLET, EXTENDED RELEASE ORAL at 16:45

## 2022-03-16 RX ADMIN — Medication 120 MG: at 10:34

## 2022-03-16 RX ADMIN — SODIUM CHLORIDE, SODIUM LACTATE, POTASSIUM CHLORIDE, AND CALCIUM CHLORIDE: .6; .31; .03; .02 INJECTION, SOLUTION INTRAVENOUS at 10:30

## 2022-03-16 RX ADMIN — METHOCARBAMOL 500 MG: 500 TABLET ORAL at 23:33

## 2022-03-16 RX ADMIN — HYDROMORPHONE HYDROCHLORIDE 1 MG: 1 INJECTION, SOLUTION INTRAMUSCULAR; INTRAVENOUS; SUBCUTANEOUS at 16:46

## 2022-03-16 RX ADMIN — SODIUM CHLORIDE, SODIUM LACTATE, POTASSIUM CHLORIDE, AND CALCIUM CHLORIDE: .6; .31; .03; .02 INJECTION, SOLUTION INTRAVENOUS at 09:58

## 2022-03-16 RX ADMIN — OXYCODONE HYDROCHLORIDE 10 MG: 10 TABLET ORAL at 05:41

## 2022-03-16 RX ADMIN — SODIUM CHLORIDE, SODIUM LACTATE, POTASSIUM CHLORIDE, AND CALCIUM CHLORIDE 75 ML/HR: .6; .31; .03; .02 INJECTION, SOLUTION INTRAVENOUS at 00:48

## 2022-03-16 RX ADMIN — ALBUMIN (HUMAN): 12.5 INJECTION, SOLUTION INTRAVENOUS at 12:40

## 2022-03-16 RX ADMIN — KETAMINE HYDROCHLORIDE 50 MG: 50 INJECTION, SOLUTION INTRAMUSCULAR; INTRAVENOUS at 10:34

## 2022-03-16 RX ADMIN — ACETAMINOPHEN 975 MG: 325 TABLET ORAL at 22:20

## 2022-03-16 RX ADMIN — METHOCARBAMOL 500 MG: 500 TABLET ORAL at 00:48

## 2022-03-16 RX ADMIN — FENTANYL CITRATE 50 MCG: 50 INJECTION INTRAMUSCULAR; INTRAVENOUS at 11:04

## 2022-03-16 RX ADMIN — HYDROMORPHONE HYDROCHLORIDE 0.5 MG: 1 INJECTION, SOLUTION INTRAMUSCULAR; INTRAVENOUS; SUBCUTANEOUS at 00:48

## 2022-03-16 RX ADMIN — DEXAMETHASONE SODIUM PHOSPHATE 10 MG: 10 INJECTION, SOLUTION INTRAMUSCULAR; INTRAVENOUS at 10:34

## 2022-03-16 RX ADMIN — ROCURONIUM BROMIDE 15 MG: 50 INJECTION, SOLUTION INTRAVENOUS at 11:46

## 2022-03-16 RX ADMIN — METHOCARBAMOL 500 MG: 500 TABLET ORAL at 05:41

## 2022-03-16 RX ADMIN — OXYCODONE HYDROCHLORIDE 15 MG: 10 TABLET ORAL at 20:05

## 2022-03-16 RX ADMIN — HYDROMORPHONE HYDROCHLORIDE 1 MG: 1 INJECTION, SOLUTION INTRAMUSCULAR; INTRAVENOUS; SUBCUTANEOUS at 12:13

## 2022-03-16 RX ADMIN — GLYCOPYRROLATE 0.4 MG: 0.2 INJECTION, SOLUTION INTRAMUSCULAR; INTRAVENOUS at 13:07

## 2022-03-16 RX ADMIN — OXYCODONE HYDROCHLORIDE 10 MG: 10 TABLET ORAL at 15:48

## 2022-03-16 RX ADMIN — EPHEDRINE SULFATE 10 MG: 50 INJECTION INTRAVENOUS at 10:43

## 2022-03-16 RX ADMIN — ROCURONIUM BROMIDE 25 MG: 50 INJECTION, SOLUTION INTRAVENOUS at 10:44

## 2022-03-16 RX ADMIN — HYDROMORPHONE HYDROCHLORIDE 1 MG: 1 INJECTION, SOLUTION INTRAMUSCULAR; INTRAVENOUS; SUBCUTANEOUS at 22:20

## 2022-03-16 RX ADMIN — CEFAZOLIN SODIUM 2000 MG: 2 SOLUTION INTRAVENOUS at 22:20

## 2022-03-16 RX ADMIN — HYDROMORPHONE HYDROCHLORIDE 0.5 MG: 1 INJECTION, SOLUTION INTRAMUSCULAR; INTRAVENOUS; SUBCUTANEOUS at 11:30

## 2022-03-16 RX ADMIN — ROCURONIUM BROMIDE 10 MG: 50 INJECTION, SOLUTION INTRAVENOUS at 12:27

## 2022-03-16 RX ADMIN — SODIUM CHLORIDE, SODIUM LACTATE, POTASSIUM CHLORIDE, AND CALCIUM CHLORIDE: .6; .31; .03; .02 INJECTION, SOLUTION INTRAVENOUS at 11:38

## 2022-03-16 RX ADMIN — NEOSTIGMINE METHYLSULFATE 3 MG: 1 INJECTION INTRAVENOUS at 13:07

## 2022-03-16 RX ADMIN — ACETAMINOPHEN 975 MG: 325 TABLET ORAL at 05:41

## 2022-03-16 RX ADMIN — NICOTINE 21 MG: 21 PATCH, EXTENDED RELEASE TRANSDERMAL at 16:44

## 2022-03-16 RX ADMIN — BUPIVACAINE HYDROCHLORIDE 30 ML: 2.5 INJECTION, SOLUTION EPIDURAL; INFILTRATION; INTRACAUDAL at 10:02

## 2022-03-16 RX ADMIN — ALBUMIN (HUMAN): 12.5 INJECTION, SOLUTION INTRAVENOUS at 11:39

## 2022-03-16 RX ADMIN — CEFAZOLIN SODIUM 2000 MG: 2 SOLUTION INTRAVENOUS at 10:45

## 2022-03-16 RX ADMIN — MIDAZOLAM 2 MG: 1 INJECTION INTRAMUSCULAR; INTRAVENOUS at 10:01

## 2022-03-16 RX ADMIN — ROCURONIUM BROMIDE 10 MG: 50 INJECTION, SOLUTION INTRAVENOUS at 11:18

## 2022-03-16 RX ADMIN — LEVOTHYROXINE SODIUM 150 MCG: 75 TABLET ORAL at 05:41

## 2022-03-16 RX ADMIN — ONDANSETRON 4 MG: 2 INJECTION INTRAMUSCULAR; INTRAVENOUS at 12:55

## 2022-03-16 RX ADMIN — METHOCARBAMOL 500 MG: 500 TABLET ORAL at 16:45

## 2022-03-16 RX ADMIN — LIDOCAINE HYDROCHLORIDE 100 MG: 20 INJECTION, SOLUTION EPIDURAL; INFILTRATION; INTRACAUDAL; PERINEURAL at 10:34

## 2022-03-16 RX ADMIN — HYDROMORPHONE HYDROCHLORIDE 0.5 MG: 1 INJECTION, SOLUTION INTRAMUSCULAR; INTRAVENOUS; SUBCUTANEOUS at 14:14

## 2022-03-16 RX ADMIN — FENTANYL CITRATE 50 MCG: 50 INJECTION INTRAMUSCULAR; INTRAVENOUS at 10:01

## 2022-03-16 RX ADMIN — METOROPROLOL TARTRATE 5 MG: 5 INJECTION, SOLUTION INTRAVENOUS at 23:32

## 2022-03-16 RX ADMIN — HYDROMORPHONE HYDROCHLORIDE 0.5 MG: 1 INJECTION, SOLUTION INTRAMUSCULAR; INTRAVENOUS; SUBCUTANEOUS at 11:42
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
## 2022-03-16 NOTE — PLAN OF CARE
Problem: RESPIRATORY - ADULT  Goal: Achieves optimal ventilation and oxygenation  Description: INTERVENTIONS:  - Assess for changes in respiratory status  - Assess for changes in mentation and behavior  - Position to facilitate oxygenation and minimize respiratory effort  - Oxygen administered by appropriate delivery if ordered  - Initiate smoking cessation education as indicated  - Encourage broncho-pulmonary hygiene including cough, deep breathe, Incentive Spirometry  - Assess the need for suctioning and aspirate as needed  - Assess and instruct to report SOB or any respiratory difficulty  - Respiratory Therapy support as indicated  Outcome: Progressing     Problem: HEMATOLOGIC - ADULT  Goal: Maintains hematologic stability  Description: INTERVENTIONS  - Assess for signs and symptoms of bleeding or hemorrhage  - Monitor labs  - Administer supportive blood products/factors as ordered and appropriate  Outcome: Progressing     Problem: MUSCULOSKELETAL - ADULT  Goal: Maintain proper alignment of affected body part  Description: INTERVENTIONS:  - Support, maintain and protect limb and body alignment  - Provide patient/ family with appropriate education  Outcome: Progressing

## 2022-03-16 NOTE — ANESTHESIA POSTPROCEDURE EVALUATION
Post-Op Assessment Note    CV Status:  Stable  Pain Score: 0    Pain management: adequate     Mental Status:  Alert and awake   Hydration Status:  Euvolemic   PONV Controlled:  Controlled   Airway Patency:  Patent      Post Op Vitals Reviewed: Yes      Staff: CRNA         No complications documented      /86 (03/16/22 1348)    Temp (!) 96 9 °F (36 1 °C) (03/16/22 1348)    Pulse 68 (03/16/22 1348)   Resp 18 (03/16/22 1348)    SpO2 93 % (03/16/22 1348)

## 2022-03-16 NOTE — DISCHARGE INSTRUCTIONS
Discharge Instructions - Orthopedics  Dao Márquez 64 y o  male MRN: [id number]  Unit/Bed#: PACU 14    Weight Bearing Status:                                           Nonweightbearing left lower extremity  Absolutely no cheating  Left knee immobilizer at all times      DVT prophylaxis  Lovenox as directed    Pain:  Continue analgesics as directed    Dressing Instructions:   Please keep clean, dry and intact until follow up     Appt Instructions: If you do not have your appointment, please call the clinic at [phone] t  Otherwise followup as scheduled     Contact the office sooner if you experience any increased numbness/tingling in the extremities        Miscellaneous:

## 2022-03-16 NOTE — ASSESSMENT & PLAN NOTE
- Acute left hip hematoma, present on presentation   - Monitor physical exam   - Monitor serial H&H every 6 hours  - Hold chemical DVT prophylaxis until no evidence of active or ongoing bleeding established   - multimodal analgesic regimen   - Transfusion only as indicated

## 2022-03-16 NOTE — PHYSICAL THERAPY NOTE
Physical Therapy Cancellation Note      PT orders received and chart reviewed  Pt pending OR today with ortho for ORIF L distal femur  Will defer PT eval at this time and continue to follow and evaluate as appropriate post op      Diamond Pedro, PT, DPT

## 2022-03-16 NOTE — ASSESSMENT & PLAN NOTE
- Acute oblique mid-body sternal fracture with associated subcutaneous and retrosternal hematomas, present on admission   - Continue rib fracture protocol   - Continue to encourage incentive spirometer use and adequate pulmonary hygiene  PIC score of 7   - Continue multimodal analgesic regimen  Appreciate APS evaluation and recommendations   - Supplemental oxygen via nasal cannula as needed to maintain saturations greater than or equal to 94%  - EKG unremarkable, delta troponin at 4 hours was 7  Do not believe any further trending is necessary at this time  - Repeat chest x-ray on 3/16/2022    - PT and OT evaluation and treatment as indicated  - Outpatient follow-up in the trauma clinic for re-evaluation in approximately 2 weeks

## 2022-03-16 NOTE — ASSESSMENT & PLAN NOTE
- Acute oblique mid-body sternal fracture with associated subcutaneous and retrosternal hematomas, present on admission   - Continue rib fracture protocol   - Continue to encourage incentive spirometer use and adequate pulmonary hygiene  PIC score of 7   - Continue multimodal analgesic regimen  Appreciate APS evaluation and recommendations   - Supplemental oxygen via nasal cannula as needed to maintain saturations greater than or equal to 94%  Currently on 6 liters NC, will wean  - ECHO completed 3/16, f/u read  EKG overnight on 3/16 showed S  Tachycardia with rate of 160 bpm  Given 2 doses 5 mg IV metoprolol around midnight  HR this AM 85 bpm  Will check repeat EKG and continue to monitor on telemetry    - Repeat chest x-ray on 3/16/2022 shows no PTX/NANCY     - PT and OT evaluation and treatment as indicated  - Outpatient follow-up in the trauma clinic for re-evaluation in approximately 2 weeks
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
## 2022-03-16 NOTE — ANESTHESIA PREPROCEDURE EVALUATION
Procedure:  OPEN REDUCTION W/ INTERNAL FIXATION (ORIF) DISTAL FEMUR (Left Leg Upper)     #Distal femur fracture  #Chronic back pain s/p lumbar fusion  #Obesity, BMI 38 5    Relevant Problems   NEURO/PSYCH   (+) Chronic pain      Lab Results   Component Value Date    WBC 14 02 (H) 03/16/2022    HGB 13 4 03/16/2022    HCT 41 2 03/16/2022    MCV 90 03/16/2022     03/16/2022       Chemistry        Component Value Date/Time    K 4 0 03/16/2022 0516     03/16/2022 0516    CO2 21 03/16/2022 0516    CO2 28 03/15/2022 1210    BUN 13 03/16/2022 0516    CREATININE 0 83 03/16/2022 0516        Component Value Date/Time    CALCIUM 9 0 03/16/2022 0516        Lab Results   Component Value Date    INR 1 03 03/15/2022    PROTIME 13 1 03/15/2022         Physical Exam    Airway    Mallampati score: II  TM Distance: >3 FB  Neck ROM: full     Dental       Cardiovascular  Rhythm: regular, Rate: normal,     Pulmonary  Breath sounds clear to auscultation,     Other Findings  Intercisor Distance > 3cm          Anesthesia Plan  ASA Score- 3     Anesthesia Type- general with ASA Monitors  Additional Monitors:   Airway Plan: ETT  Comment: Discussed benefits/risks of general anesthesia including possibility of mouth/throat pain, injury to lips/teeth, nausea/vomiting, and surgical pain along with more rare complications such as stroke, MI, pneumonia, aspiration, and injury to blood vessels  Patient understands and wishes to proceed  All questions answered  Discussed nerve block to assist with post-operative analgesia  Discussed possibility of uncommon complications including permanent nerve injury, damage to blood vessels, infection local anesthetic toxicity, and nerve block failure  Patient understands and wishes to proceed          Plan Factors-Exercise tolerance (METS): >4 METS  Chart reviewed  EKG reviewed  Existing labs reviewed  Induction- intravenous and rapid sequence induction      Postoperative Plan- Plan for postoperative opioid use  Planned trial extubation    Informed Consent- Anesthetic plan and risks discussed with patient  I personally reviewed this patient with the CRNA  Discussed and agreed on the Anesthesia Plan with the CRNA       Modified RSI
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
## 2022-03-16 NOTE — ASSESSMENT & PLAN NOTE
- Acute left hip hematoma with active extravasation, present on presentation   - Monitor physical exam   - Hgb 9 3 today down from 10 3 post op day #1  Will repeat Hgb in AM    - lovenox started for DVT ppx  - Continue multimodal analgesic regimen   - Transfusion only as indicated

## 2022-03-16 NOTE — ASSESSMENT & PLAN NOTE
- Acute left hip hematoma, present on presentation   - Monitor physical exam   - HGb 9 3 today down from 10 3 post op day #1  Will repeat Hgb in AM    - Lovenox started on DVT ppx  - multimodal analgesic regimen   - Transfusion only as indicated

## 2022-03-16 NOTE — OCCUPATIONAL THERAPY NOTE
OT CANCEL NOTE    OT orders received  Chart reviewed  Pt is pending OR today for ORIF L distal femur fracture  Will hold initial OT evaluation  Will continue to follow pt on caseload and see pt when medically stable and as clinically appropriate, post-op        03/16/22 0786   OT Last Visit   OT Visit Date 03/16/22   Note Type   Note type Evaluation; Cancelled Session   Cancel Reasons Patient to operating room           Kristopher Perez MS, OTR/L
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
## 2022-03-16 NOTE — ASSESSMENT & PLAN NOTE
- Abrasions of multiple sites including the lower anterior chest wall, lower abdominal wall, and left lower extremity   - Local wound care as indicated  - Tetanus vaccination status updated  - Analgesia as needed   -apply bacitracin to lower abdominal b i d

## 2022-03-16 NOTE — ASSESSMENT & PLAN NOTE
- Acute left hip hematoma with active extravasation, present on presentation   - Monitor physical exam   - Monitor serial H&H every 6 hours:  Hemoglobin was 15 3 upon admission, now 13 4  - Hold chemical DVT prophylaxis until no evidence of active or ongoing bleeding established  - Initiate multimodal analgesic regimen   - Transfusion only as indicated

## 2022-03-16 NOTE — ASSESSMENT & PLAN NOTE
- Acute comminuted left distal femur fracture, present on admission   - orthopedics consulted, plan for operative intervention 03/16/2022  - Maintain non-weightbearing status on the left lower extremity   - Monitor left lower extremity neurovascular exam   - Continue multimodal analgesic regimen   - Continue DVT prophylaxis  - PT and OT evaluation and treatment as indicated  - Outpatient follow up with Orthopedic surgery for re-evaluation

## 2022-03-16 NOTE — ANESTHESIA PROCEDURE NOTES
Peripheral Block    Patient location during procedure: holding area  Start time: 3/16/2022 10:02 AM  Reason for block: at surgeon's request and post-op pain management  Staffing  Performed: Anesthesiologist   Anesthesiologist: Brisa Snowden MD  Preanesthetic Checklist  Completed: patient identified, IV checked, site marked, risks and benefits discussed, surgical consent, monitors and equipment checked, pre-op evaluation and timeout performed  Peripheral Block  Patient position: supine  Prep: ChloraPrep  Patient monitoring: continuous pulse ox and frequent blood pressure checks  Block type: femoral  Laterality: left  Injection technique: single-shot  Procedures: ultrasound guided, Ultrasound guidance required for the procedure to increase accuracy and safety of medication placement and decrease risk of complications    Ultrasound permanent image savedbupivacaine (MARCAINE) 0 25 % perineural infiltration, 30 mL  Needle  Needle type: Stimuplex   Needle gauge: 21 G  Needle length: 10 cm  Needle localization: ultrasound guidance  Test dose: negative  Assessment  Injection assessment: incremental injection and local visualized surrounding nerve on ultrasound  Paresthesia pain: none  patient tolerated the procedure well with no immediate complications  Additional Notes  Left femoral nerve block with 20ml 0 25% bupivacaine with 2mg perineural decadron; supplemented with 10ml of left LFCN for lateral incision
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
## 2022-03-16 NOTE — ASSESSMENT & PLAN NOTE
Right ankle x-ray found heel spur and Small avulsions are present adjacent to the medial and lateral malleoli, likely chronic  -these are unlikely related to trauma  -patient does have tenderness to palpation over medial malleolus  -can follow-up as an outpatient

## 2022-03-16 NOTE — ASSESSMENT & PLAN NOTE
- Multiple bilateral rib fractures, present on admission  Patient with minimally displaced with the lateral 3rd rib fracture as well as nondisplaced right anterolateral 4-9 rib fractures, and nondisplaced left anterolateral 3rd, 5th and 8th rib fractures  - Continue rib fracture protocol   - Continue to encourage incentive spirometer use and adequate pulmonary hygiene  Currently pulling 1500 mL on I S  PIC score 7   - Continue multimodal analgesic regimen  APS evaluation and recommendations pending   - Supplemental oxygen via nasal cannula as needed to maintain saturations greater than or equal to 94%  Currently on 6 liters with oxygen saturations 97%  - Repeat chest x-ray on 3/16/2022    - PT and OT evaluation and treatment as indicated  - Outpatient follow-up in the trauma clinic for re-evaluation in approximately 2 weeks

## 2022-03-16 NOTE — PROGRESS NOTES
Progress Note - Orthopedics   Lizzette  64 y o  male MRN: [id number]  [facility]      Subjective:    64 y o male with comminuted left distal femur fracture  No acute events, no complaints  Pt doing well  Pain controlled   Denies fevers chills, CP, SOB    Labs:  0   Lab Value Date/Time    HCT 41 2 03/16/2022 0516    HCT 41 3 03/15/2022 2335    HCT 43 4 03/15/2022 1726    HGB 13 4 03/16/2022 0516    HGB 13 9 03/15/2022 2335    HGB 14 9 03/15/2022 1726    INR 1 03 03/15/2022 1209    WBC 14 02 (H) 03/16/2022 0516    WBC 19 26 (H) 03/15/2022 1209       Meds:    Current Facility-Administered Medications:     acetaminophen (TYLENOL) tablet 975 mg, 975 mg, Oral, Q8H [facility], Marty aBker PA-C, 975 mg at 03/16/22 0541    bisacodyl (DULCOLAX) rectal suppository 10 mg, 10 mg, Rectal, Daily PRN, America Verdin PA-C    gabapentin (NEURONTIN) capsule 100 mg, 100 mg, Oral, TID, America Verdin PA-C, 100 mg at 03/15/22 2136    HYDROmorphone (DILAUDID) injection 0 5 mg, 0 5 mg, Intravenous, Q1H PRN, America Verdin PA-C, 0 5 mg at 03/16/22 0048    lactated ringers infusion, 75 mL/hr, Intravenous, Continuous, Lizzette Tuttle MD, Last Rate: 75 mL/hr at 03/16/22 0048, 75 mL/hr at 03/16/22 0048    levothyroxine tablet 150 mcg, 150 mcg, Oral, Early Morning, Marty Baker PA-C, 150 mcg at 03/16/22 0541    methocarbamol (ROBAXIN) tablet 500 mg, 500 mg, Oral, Q6H [facility], Marty Baker PA-C, 500 mg at 03/16/22 0541    naloxone (NARCAN) 0 04 mg/mL syringe 0 04 mg, 0 04 mg, Intravenous, Q1MIN PRN, America Verdin PA-C    nicotine (NICODERM CQ) 21 mg/24 hr TD 24 hr patch 21 mg, 21 mg, Transdermal, Daily, Lowella Holstein DO Kathia, 21 mg at 03/15/22 1838    ondansetron (ZOFRAN) injection 4 mg, 4 mg, Intravenous, Q4H PRN, America Verdin PA-C    oxyCODONE (ROXICODONE) immediate release tablet 10 mg, 10 mg, Oral, Q4H PRN, Marty Baker PA-C, 10 mg at 03/16/22 0541    oxyCODONE (ROXICODONE) IR tablet 5 mg, 5 mg, Oral, Q4H PRN, America Verdin, GERARDO    polyethylene glycol (MIRALAX) packet 17 g, 17 g, Oral, Daily, Marty Baker PA-C    senna-docusate sodium (SENOKOT S) 8 6-50 mg per tablet 2 tablet, 2 tablet, Oral, Daily, Marty Baker PA-C    Blood Culture:   No results found for: BLOODCX    Wound Culture:   No results found for: WOUNDCULT    Ins and Outs:  I/O last 24 hours: In: -   Out: 100 [Urine:100]          Physical:  Vitals:    03/15/22 2328   BP: 124/74   Pulse: (!) 111   Resp: 19   Temp: 99 7 °F (37 6 °C)   SpO2: 91%     Musculoskeletal: left Lower Extremity  · Skin flaking, dry, intact   · Mild swelling about the ankle joints   · Tender to palpation over entire knee and distal femur  · Painful range of motion about knee   · In knee immobilizer  · Unable to tolerate valgus/varus stress due to pain  · Sensation intact to light touch  · Firing EHL/FHL  · Positive ankle dorsiflexion and plantar flexion  · Soft tissue soft and compressible throughout entire left lower extremity    Assessment:    61 y o male with comminuted left distal femur fracture       Plan:  · NWB LLE in KI  · To OR today fr ORIF of left distal femur fractuer  · PT/OT  · Pain control per Primary  · DVT ppx per Primary  · Dispo: Ortho will follow    Claudia Duarte MD

## 2022-03-16 NOTE — ASSESSMENT & PLAN NOTE
- Acute comminuted left distal femur fracture, present on admission   - orthopedics consulted  - s/p ORIF L femur plating on 3/16  - NWB LLE in KI  - Monitor left lower extremity neurovascular exam   - Continue multimodal analgesic regimen   - Continue DVT prophylaxis  - PT and OT evaluation and treatment as indicated  - Outpatient follow up with Orthopedic surgery for re-evaluation

## 2022-03-16 NOTE — OP NOTE
OPERATIVE REPORT  PATIENT NAME: Leola Pulliam    :  1960  MRN: [id number]  Pt Location: BE OR ROOM 04    SURGERY DATE: 3/16/2022    Surgeon(s) and Role:     * Jamin Myrick MD - Primary     * Sagar Ruelas PA-C - Assisting     * Sherrill Sherman MD - Assisting    Preop Diagnosis:  Closed fracture of distal end of left femur, unspecified fracture morphology, initial encounter ([place] [address] ) [S72 402A]    Post-Op Diagnosis Codes:     * Closed fracture of distal end of left femur, unspecified fracture morphology, initial encounter ([place] [address] ) [S72 402A]    Procedure(s) (LRB):  OPEN REDUCTION W/ INTERNAL FIXATION (ORIF) DISTAL FEMUR (Left)  Open reduction internal fixation left distal femur fracture including supracondylar with intercondylar extension  Specimen(s):  * No specimens in log *    Estimated Blood Loss:   500 mL    Drains:  Closed/Suction Drain Anterior; Left Knee Accordion 10 Fr  (Active)   Site Description Unable to view 22 1340   Dressing Status Clean;Dry; Intact 22 1340   Drainage Appearance Serosanguineous 22 1340   Status Accordion suction 22 1340   Number of days: 0       Anesthesia Type:   General w/ Regional    Operative Indications:  Closed fracture of distal end of left femur, unspecified fracture morphology, initial encounter ([place] [address] ) [E02 402A]  Closed fracture left distal femur which was supracondylar with intercondylar extension    Operative Findings:  ORIF utilizing 12 full lateral plate    Complications:   None    Procedure and Technique: Following induction of adequate level of general anesthesia, the left lower extremity was prepped and draped  A bump was placed underneath the left buttock to internally rotate the leg  A midline knee incision was created the knee in flexion  Full-thickness flaps raised get the extensor mechanism  A lateral arthrotomy was created open up the knee joint    He arthrosis was evacuated, the intra-articular component the fracture was identified  Lateral condyle was intact, it was split  lateral medial condyles, in addition there was a Hoffa type split  the medial condyle into anterior posterior pieces  The 1st maneuver involved accurate reduction of the knee joint, this was held provisionally reduced with multiple K-wires  The femur was then brought out to length, and once proper AP and lateral x-rays were obtained demonstrating appropriate reduction, a 12 hole lateral condylar plate was applied  Multiple lock screws were placed maintaining the reduction in the joint and distally, several lock screws were placed proximally obtaining balanced fixation the process  To facilitate plate application of the intact femur, a separate lateral based incision was made along the mid shaft of the femur  This incision made to the skin, the iliotibial band was split, the vastus lateralis was split, exposing the mid shaft of the left femur  One piece of comminution from the medial femoral cortex was lagged back into position through the plate  The fluoroscope was brought in, final fluoroscopic films document a well-aligned fracture, good hardware position  Satisfied with the extent of surgery, the wounds then flushed with saline closed  A drain was placed deep brought out via separate anteromedial stab incision  The arthrotomy was closed number Vicryl suture  The subcu tissue closed 2 Vicryl suture  The skin was closed staples  A few 2 O nylon sutures were used to close the knee incision is well  Sterile dressings were applied  He was then placed in a knee immobilizer  He was awakened from general anesthesia, taken recovery room stable condition plans to include physical therapy for nonweightbearing left lower extremity    DVT prophylaxis will be a strong consideration   I was present for the entire procedure    Patient Disposition:  PACU       SIGNATURE: Catarina Olmos MD  DATE: March 16, 2022  TIME: 4:55 PM

## 2022-03-16 NOTE — PROGRESS NOTES
Post Op Check:    S:  25-year-old male status post open reduction with internal fixation of the left distal femur, estimated blood loss 500 mL  O:  Visit Vitals  /87   Pulse 94   Temp 98 2 °F (36 8 °C)   Resp 12   Wt 118 kg (261 lb 3 9 oz)   SpO2 98%   Smoking Status Current Every Day Smoker   Physical Exam  Vitals and nursing note reviewed  Constitutional:       General: He is not in acute distress  HENT:      Head: Normocephalic and atraumatic  Right Ear: External ear normal       Left Ear: External ear normal       Nose: Nose normal       Mouth/Throat:      Mouth: Mucous membranes are moist    Eyes:      Extraocular Movements: Extraocular movements intact  Conjunctiva/sclera: Conjunctivae normal       Pupils: Pupils are equal, round, and reactive to light  Cardiovascular:      Rate and Rhythm: Normal rate and regular rhythm  Pulses: Normal pulses  Heart sounds: Normal heart sounds  No murmur heard  Comments: Bruising across anterior chest wall  Pulmonary:      Effort: Pulmonary effort is normal  No respiratory distress  Breath sounds: Normal breath sounds  Abdominal:      General: Abdomen is flat  Bowel sounds are normal       Tenderness: There is abdominal tenderness (lower abdomen)  There is no guarding or rebound  Musculoskeletal:      Cervical back: Normal range of motion and neck supple  Comments: Left lower extremity in knee immobilizer, PIPER drain in place with bloody drainage   Skin:     General: Skin is warm and dry  Capillary Refill: Capillary refill takes less than 2 seconds  Findings: Bruising (lower abdomen) present  Neurological:      General: No focal deficit present  Mental Status: He is alert and oriented to person, place, and time     Psychiatric:         Mood and Affect: Mood normal          Behavior: Behavior normal          A/P:  -Continue multimodal pain regimen  -Acute pain Services have been consulted, appreciate recommendations  -Check postop labs  -Initiate DVT prophylaxis  -Follow-up echocardiogram and repeat chest x-ray to evaluate for blunt cardiac injury  -Regular diet  -PT/OT

## 2022-03-17 ENCOUNTER — APPOINTMENT (INPATIENT)
Dept: RADIOLOGY | Facility: HOSPITAL | Age: 62
DRG: 481 | End: 2022-03-17
Payer: COMMERCIAL

## 2022-03-17 PROBLEM — R09.02 HYPOXIA: Status: ACTIVE | Noted: 2022-03-17

## 2022-03-17 PROBLEM — R00.0 SINUS TACHYCARDIA: Status: ACTIVE | Noted: 2022-03-17

## 2022-03-17 LAB
2HR DELTA HS TROPONIN: 0 NG/L
4HR DELTA HS TROPONIN: 0 NG/L
ANION GAP SERPL CALCULATED.3IONS-SCNC: 5 MMOL/L (ref 4–13)
APICAL FOUR CHAMBER EJECTION FRACTION: 77 %
ASCENDING AORTA: 3.3 CM (ref 2.24–3.36)
ATRIAL RATE: 114 BPM
ATRIAL RATE: 114 BPM
ATRIAL RATE: 157 BPM
ATRIAL RATE: 157 BPM
ATRIAL RATE: 160 BPM
ATRIAL RATE: 93 BPM
BASOPHILS # BLD AUTO: 0.02 THOUSANDS/ΜL (ref 0–0.1)
BASOPHILS NFR BLD AUTO: 0 % (ref 0–1)
BUN SERPL-MCNC: 15 MG/DL (ref 5–25)
CA-I BLD-SCNC: 0.95 MMOL/L (ref 1.12–1.32)
CALCIUM SERPL-MCNC: 8.2 MG/DL (ref 8.3–10.1)
CARDIAC TROPONIN I PNL SERPL HS: 8 NG/L
CHLORIDE SERPL-SCNC: 101 MMOL/L (ref 100–108)
CO2 SERPL-SCNC: 26 MMOL/L (ref 21–32)
CREAT SERPL-MCNC: 0.8 MG/DL (ref 0.6–1.3)
E WAVE DECELERATION TIME: 191 MS
EOSINOPHIL # BLD AUTO: 0 THOUSAND/ΜL (ref 0–0.61)
EOSINOPHIL NFR BLD AUTO: 0 % (ref 0–6)
ERYTHROCYTE [DISTWIDTH] IN BLOOD BY AUTOMATED COUNT: 14 % (ref 11.6–15.1)
GFR SERPL CREATININE-BSD FRML MDRD: 96 ML/MIN/1.73SQ M
GLUCOSE SERPL-MCNC: 134 MG/DL (ref 65–140)
HCT VFR BLD AUTO: 27.2 % (ref 36.5–49.3)
HGB BLD-MCNC: 9.3 G/DL (ref 12–17)
IMM GRANULOCYTES # BLD AUTO: 0.15 THOUSAND/UL (ref 0–0.2)
IMM GRANULOCYTES NFR BLD AUTO: 1 % (ref 0–2)
LAAS-AP2: 20.3 CM2
LAAS-AP4: 13.2 CM2
LYMPHOCYTES # BLD AUTO: 2.01 THOUSANDS/ΜL (ref 0.6–4.47)
LYMPHOCYTES NFR BLD AUTO: 11 % (ref 14–44)
MCH RBC QN AUTO: 29.7 PG (ref 26.8–34.3)
MCHC RBC AUTO-ENTMCNC: 34.2 G/DL (ref 31.4–37.4)
MCV RBC AUTO: 87 FL (ref 82–98)
MONOCYTES # BLD AUTO: 1.62 THOUSAND/ΜL (ref 0.17–1.22)
MONOCYTES NFR BLD AUTO: 9 % (ref 4–12)
MV E'TISSUE VEL-SEP: 10 CM/S
MV PEAK A VEL: 0.52 M/S
MV PEAK E VEL: 53 CM/S
MV STENOSIS PRESSURE HALF TIME: 55 MS
MV VALVE AREA P 1/2 METHOD: 4 CM2
NEUTROPHILS # BLD AUTO: 14.41 THOUSANDS/ΜL (ref 1.85–7.62)
NEUTS SEG NFR BLD AUTO: 79 % (ref 43–75)
NRBC BLD AUTO-RTO: 0 /100 WBCS
P AXIS: -29 DEGREES
P AXIS: 53 DEGREES
P AXIS: 54 DEGREES
P AXIS: 61 DEGREES
PHOSPHATE SERPL-MCNC: 2.4 MG/DL (ref 2.3–4.1)
PLATELET # BLD AUTO: 174 THOUSANDS/UL (ref 149–390)
PMV BLD AUTO: 9.5 FL (ref 8.9–12.7)
POTASSIUM SERPL-SCNC: 5.5 MMOL/L (ref 3.5–5.3)
PR INTERVAL: 124 MS
PR INTERVAL: 126 MS
PR INTERVAL: 134 MS
PR INTERVAL: 142 MS
PR INTERVAL: 160 MS
PR INTERVAL: 160 MS
QRS AXIS: 28 DEGREES
QRS AXIS: 29 DEGREES
QRS AXIS: 32 DEGREES
QRS AXIS: 45 DEGREES
QRS AXIS: 46 DEGREES
QRS AXIS: 47 DEGREES
QRSD INTERVAL: 116 MS
QRSD INTERVAL: 82 MS
QRSD INTERVAL: 82 MS
QRSD INTERVAL: 88 MS
QRSD INTERVAL: 90 MS
QRSD INTERVAL: 90 MS
QT INTERVAL: 296 MS
QT INTERVAL: 298 MS
QT INTERVAL: 298 MS
QT INTERVAL: 312 MS
QT INTERVAL: 328 MS
QT INTERVAL: 350 MS
QTC INTERVAL: 430 MS
QTC INTERVAL: 435 MS
QTC INTERVAL: 452 MS
QTC INTERVAL: 481 MS
QTC INTERVAL: 481 MS
QTC INTERVAL: 482 MS
RBC # BLD AUTO: 3.13 MILLION/UL (ref 3.88–5.62)
RIGHT ATRIUM AREA SYSTOLE A4C: 14 CM2
RIGHT VENTRICLE ID DIMENSION: 3.5 CM
SL CV LEFT ATRIUM LENGTH A2C: 5.8 CM
SL CV LV EF: 75
SODIUM SERPL-SCNC: 132 MMOL/L (ref 136–145)
T WAVE AXIS: -26 DEGREES
T WAVE AXIS: 16 DEGREES
T WAVE AXIS: 42 DEGREES
T WAVE AXIS: 58 DEGREES
T WAVE AXIS: 60 DEGREES
T WAVE AXIS: 62 DEGREES
VENTRICULAR RATE: 114 BPM
VENTRICULAR RATE: 114 BPM
VENTRICULAR RATE: 157 BPM
VENTRICULAR RATE: 157 BPM
VENTRICULAR RATE: 160 BPM
VENTRICULAR RATE: 93 BPM
WBC # BLD AUTO: 18.21 THOUSAND/UL (ref 4.31–10.16)
Z-SCORE OF ASCENDING AORTA: 1.77

## 2022-03-17 PROCEDURE — 93010 ELECTROCARDIOGRAM REPORT: CPT | Performed by: INTERNAL MEDICINE

## 2022-03-17 PROCEDURE — 99233 SBSQ HOSP IP/OBS HIGH 50: CPT | Performed by: STUDENT IN AN ORGANIZED HEALTH CARE EDUCATION/TRAINING PROGRAM

## 2022-03-17 PROCEDURE — 71045 X-RAY EXAM CHEST 1 VIEW: CPT

## 2022-03-17 PROCEDURE — 97163 PT EVAL HIGH COMPLEX 45 MIN: CPT

## 2022-03-17 PROCEDURE — 85025 COMPLETE CBC W/AUTO DIFF WBC: CPT

## 2022-03-17 PROCEDURE — NC001 PR NO CHARGE: Performed by: ORTHOPAEDIC SURGERY

## 2022-03-17 PROCEDURE — 80048 BASIC METABOLIC PNL TOTAL CA: CPT

## 2022-03-17 PROCEDURE — 82330 ASSAY OF CALCIUM: CPT

## 2022-03-17 PROCEDURE — 84484 ASSAY OF TROPONIN QUANT: CPT | Performed by: STUDENT IN AN ORGANIZED HEALTH CARE EDUCATION/TRAINING PROGRAM

## 2022-03-17 PROCEDURE — 84100 ASSAY OF PHOSPHORUS: CPT

## 2022-03-17 PROCEDURE — 97167 OT EVAL HIGH COMPLEX 60 MIN: CPT

## 2022-03-17 PROCEDURE — 93005 ELECTROCARDIOGRAM TRACING: CPT

## 2022-03-17 RX ORDER — POTASSIUM CHLORIDE 14.9 MG/ML
20 INJECTION INTRAVENOUS
Status: DISPENSED | OUTPATIENT
Start: 2022-03-17 | End: 2022-03-17

## 2022-03-17 RX ORDER — METOPROLOL TARTRATE 5 MG/5ML
5 INJECTION INTRAVENOUS ONCE
Status: COMPLETED | OUTPATIENT
Start: 2022-03-17 | End: 2022-03-17

## 2022-03-17 RX ORDER — MAGNESIUM SULFATE HEPTAHYDRATE 40 MG/ML
2 INJECTION, SOLUTION INTRAVENOUS ONCE
Status: COMPLETED | OUTPATIENT
Start: 2022-03-17 | End: 2022-03-17

## 2022-03-17 RX ADMIN — OXYCODONE HYDROCHLORIDE 15 MG: 10 TABLET ORAL at 21:03

## 2022-03-17 RX ADMIN — HYDROMORPHONE HYDROCHLORIDE 1 MG: 1 INJECTION, SOLUTION INTRAMUSCULAR; INTRAVENOUS; SUBCUTANEOUS at 14:27

## 2022-03-17 RX ADMIN — HYDROMORPHONE HYDROCHLORIDE 1 MG: 1 INJECTION, SOLUTION INTRAMUSCULAR; INTRAVENOUS; SUBCUTANEOUS at 19:53

## 2022-03-17 RX ADMIN — ACETAMINOPHEN 975 MG: 325 TABLET ORAL at 14:27

## 2022-03-17 RX ADMIN — ENOXAPARIN SODIUM 30 MG: 30 INJECTION SUBCUTANEOUS at 10:46

## 2022-03-17 RX ADMIN — ENOXAPARIN SODIUM 30 MG: 30 INJECTION SUBCUTANEOUS at 21:04

## 2022-03-17 RX ADMIN — OXYCODONE HYDROCHLORIDE 15 MG: 10 TABLET ORAL at 04:43

## 2022-03-17 RX ADMIN — OXYCODONE HYDROCHLORIDE 15 MG: 10 TABLET ORAL at 17:21

## 2022-03-17 RX ADMIN — METHOCARBAMOL 500 MG: 500 TABLET ORAL at 17:21

## 2022-03-17 RX ADMIN — LEVOTHYROXINE SODIUM 150 MCG: 75 TABLET ORAL at 05:15

## 2022-03-17 RX ADMIN — POTASSIUM CHLORIDE 20 MEQ: 14.9 INJECTION, SOLUTION INTRAVENOUS at 02:16

## 2022-03-17 RX ADMIN — METOROPROLOL TARTRATE 5 MG: 5 INJECTION, SOLUTION INTRAVENOUS at 00:07

## 2022-03-17 RX ADMIN — NICOTINE 21 MG: 21 PATCH, EXTENDED RELEASE TRANSDERMAL at 08:48

## 2022-03-17 RX ADMIN — OXYCODONE HYDROCHLORIDE 15 MG: 10 TABLET ORAL at 12:50

## 2022-03-17 RX ADMIN — MORPHINE SULFATE 105 MG: 15 TABLET, FILM COATED, EXTENDED RELEASE ORAL at 08:47

## 2022-03-17 RX ADMIN — LORATADINE AND PSEUDOEPHEDRINE 1 TABLET: 10; 240 TABLET, EXTENDED RELEASE ORAL at 08:48

## 2022-03-17 RX ADMIN — MORPHINE SULFATE 105 MG: 15 TABLET, FILM COATED, EXTENDED RELEASE ORAL at 21:15

## 2022-03-17 RX ADMIN — METHOCARBAMOL 500 MG: 500 TABLET ORAL at 12:50

## 2022-03-17 RX ADMIN — METHOCARBAMOL 500 MG: 500 TABLET ORAL at 05:15

## 2022-03-17 RX ADMIN — ACETAMINOPHEN 975 MG: 325 TABLET ORAL at 05:15

## 2022-03-17 RX ADMIN — MAGNESIUM SULFATE HEPTAHYDRATE 2 G: 40 INJECTION, SOLUTION INTRAVENOUS at 01:59

## 2022-03-17 RX ADMIN — METHOCARBAMOL 500 MG: 500 TABLET ORAL at 23:53

## 2022-03-17 RX ADMIN — HYDROMORPHONE HYDROCHLORIDE 1 MG: 1 INJECTION, SOLUTION INTRAMUSCULAR; INTRAVENOUS; SUBCUTANEOUS at 10:46

## 2022-03-17 RX ADMIN — OXYCODONE HYDROCHLORIDE 15 MG: 10 TABLET ORAL at 08:48

## 2022-03-17 RX ADMIN — CEFAZOLIN SODIUM 2000 MG: 2 SOLUTION INTRAVENOUS at 05:14

## 2022-03-17 RX ADMIN — ACETAMINOPHEN 975 MG: 325 TABLET ORAL at 21:03
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
## 2022-03-17 NOTE — PROGRESS NOTES
Patient HR sustaining in 150s on Telemetry at 2300  Patient denies SOB, pain, or any other symptoms at this time  Trauma resident Keaton Crow notified via [facility] Text of patient sustaining increased HR  Resident placed EKG and lab orders which were obtained  Metoprolol 5mg IV administered at 2330 per order  Patient's HR continuing to sustain in the 150's at 0000  Trauma resident notified  Care of patient transferred to incoming nurse
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
## 2022-03-17 NOTE — PROGRESS NOTES
Progress Note - Orthopedics   Adan Mcgraw 64 y o  male MRN: [id number]  Unit/Bed#: [facility] 629-01      Subjective:    64 y o male POD 1 ORIF left distal femur fracture  No acute events, no complaints  Pt doing well  Pain controlled   Denies fevers chills, CP, SOB    Labs:  0   Lab Value Date/Time    HCT 27 2 (L) 03/17/2022 0438    HCT 31 3 (L) 03/16/2022 2318    HCT 32 1 (L) 03/16/2022 1902    HGB 9 3 (L) 03/17/2022 0438    HGB 10 3 (L) 03/16/2022 2318    HGB 10 5 (L) 03/16/2022 1902    INR 1 03 03/15/2022 1209    WBC 18 21 (H) 03/17/2022 0438    WBC 17 34 (H) 03/16/2022 1902    WBC 14 02 (H) 03/16/2022 0516       Meds:    Current Facility-Administered Medications:     acetaminophen (TYLENOL) tablet 975 mg, 975 mg, Oral, Q8H [address], Kendy Shaikh MD, 975 mg at 03/17/22 0515    bisacodyl (DULCOLAX) rectal suppository 10 mg, 10 mg, Rectal, Daily PRN, Kendy Shaikh MD    gabapentin (NEURONTIN) capsule 100 mg, 100 mg, Oral, TID, Kendy Shaikh MD, 100 mg at 03/15/22 2136    HYDROmorphone (DILAUDID) injection 1 mg, 1 mg, Intravenous, Q3H PRN, Jensen Villar MD, 1 mg at 03/16/22 2220    levothyroxine tablet 150 mcg, 150 mcg, Oral, Early Morning, Kendy Shaikh MD, 150 mcg at 03/17/22 0515    loratadine-pseudoephedrine (CLARITIN-D 24-HOUR)  mg per 24 hr tablet 1 tablet, 1 tablet, Oral, Daily, Jensen Villar MD, 1 tablet at 03/16/22 1645    methocarbamol (ROBAXIN) tablet 500 mg, 500 mg, Oral, Q6H [address], Kendy Shaikh MD, 500 mg at 03/17/22 0515    morphine (MS CONTIN) ER tablet 105 mg, 105 mg, Oral, Q12H [address], Miguelmaxime Royal, CRNP, 105 mg at 03/16/22 2227    naloxone (NARCAN) 0 04 mg/mL syringe 0 04 mg, 0 04 mg, Intravenous, Q1MIN PRN, Kendy Shaikh MD    nicotine (NICODERM CQ) 21 mg/24 hr TD 24 hr patch 21 mg, 21 mg, Transdermal, Daily, Narinder Villar MD, 21 mg at 03/16/22 1644    ondansetron (ZOFRAN) injection 4 mg, 4 mg, Intravenous, Q4H PRN, Kendy Shaikh MD    oxyCODONE (Lindsay Danica) IR tablet 15 mg, 15 mg, Oral, Q4H PRN, Robyn Villar MD, 15 mg at 03/17/22 0443    oxyCODONE (ROXICODONE) IR tablet 7 5 mg, 7 5 mg, Oral, Q4H PRN, Robyn Villar MD    polyethylene glycol (MIRALAX) packet 17 g, 17 g, Oral, Daily, Jean Rodarte MD    senna-docusate sodium (SENOKOT S) 8 6-50 mg per tablet 2 tablet, 2 tablet, Oral, Daily, Jean Rodarte MD    Blood Culture:   No results found for: BLOODCX    Wound Culture:   No results found for: WOUNDCULT    Ins and Outs:  I/O last 24 hours: In: 2000 [I V :1500; IV Piggyback:500]  Out: 850 [Urine:100; Drains:250; Blood:500]          Physical:  Vitals:    03/17/22 0247   BP: 130/67   Pulse: 82   Resp: 16   Temp: 99 2 °F (37 3 °C)   SpO2: 94%     Musculoskeletal: left Lower Extremity  · Skin flaking, dry, intact   · Dressing c/d/i  · HV in place  · Mild swelling about the ankle joints   · Tender to palpation over entire knee and distal femur  · In knee immobilizer  · Sensation intact to light touch  · Firing EHL/FHL  · Positive ankle dorsiflexion and plantar flexion  · Soft tissue soft and compressible throughout entire left lower extremity    Assessment:    61 y o male POD 1 ORIF left distal femur fracture       Plan:  · NWB LLE in KI  · PT/OT  · Pain control per Primary  · DVT ppx per Primary  · Dispo: Ortho will follow    Josie Thomas MD
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
## 2022-03-17 NOTE — PLAN OF CARE
Problem: RESPIRATORY - ADULT  Goal: Achieves optimal ventilation and oxygenation  Description: INTERVENTIONS:  - Assess for changes in respiratory status  - Assess for changes in mentation and behavior  - Position to facilitate oxygenation and minimize respiratory effort  - Oxygen administered by appropriate delivery if ordered  - Initiate smoking cessation education as indicated  - Encourage broncho-pulmonary hygiene including cough, deep breathe, Incentive Spirometry  - Assess the need for suctioning and aspirate as needed  - Assess and instruct to report SOB or any respiratory difficulty  - Respiratory Therapy support as indicated  3/17/2022 1037 by Afshan Marin RN  Outcome: Progressing  3/17/2022 1036 by Afshan Marin RN  Outcome: Progressing     Problem: HEMATOLOGIC - ADULT  Goal: Maintains hematologic stability  Description: INTERVENTIONS  - Assess for signs and symptoms of bleeding or hemorrhage  - Monitor labs  - Administer supportive blood products/factors as ordered and appropriate  3/17/2022 1037 by Afshan Marin RN  Outcome: Progressing  3/17/2022 1036 by Afshan Marin RN  Outcome: Progressing     Problem: MUSCULOSKELETAL - ADULT  Goal: Maintain proper alignment of affected body part  Description: INTERVENTIONS:  - Support, maintain and protect limb and body alignment  - Provide patient/ family with appropriate education  3/17/2022 1037 by Afshan Marin RN  Outcome: Progressing  3/17/2022 1036 by Afshan Marin RN  Outcome: Progressing     Problem: MOBILITY - ADULT  Goal: Maintain or return to baseline ADL function  Description: INTERVENTIONS:  -  Assess patient's ability to carry out ADLs; assess patient's baseline for ADL function and identify physical deficits which impact ability to perform ADLs (bathing, care of mouth/teeth, toileting, grooming, dressing, etc )  - Assess/evaluate cause of self-care deficits   - Assess range of motion  - Assess patient's mobility; develop plan if impaired  - Assess patient's need for assistive devices and provide as appropriate  - Encourage maximum independence but intervene and supervise when necessary  - Involve family in performance of ADLs  - Assess for home care needs following discharge   - Consider OT consult to assist with ADL evaluation and planning for discharge  - Provide patient education as appropriate  3/17/2022 1037 by Kayden Finnegan RN  Outcome: Progressing  3/17/2022 1036 by Kayden Finnegan RN  Outcome: Progressing  Goal: Maintains/Returns to pre admission functional level  Description: INTERVENTIONS:  - Perform BMAT or MOVE assessment daily    - Set and communicate daily mobility goal to care team and patient/family/caregiver  - Collaborate with rehabilitation services on mobility goals if consulted  - Perform Range of Motion   - Reposition patient every 2 hours    - Out of bed to chair  - Out of bed for meals   - Out of bed for toileting  - Record patient progress and toleration of activity level   3/17/2022 1037 by Kayden Finnegan RN  Outcome: Progressing  3/17/2022 1036 by Kayden Finnegan RN  Outcome: Progressing     Problem: Potential for Falls  Goal: Patient will remain free of falls  Description: INTERVENTIONS:  - Educate patient/family on patient safety including physical limitations  - Instruct patient to call for assistance with activity   - Consult OT/PT to assist with strengthening/mobility   - Keep Call bell within reach  - Keep bed low and locked with side rails adjusted as appropriate  - Keep care items and personal belongings within reach  - Initiate and maintain comfort rounds  - Make Fall Risk Sign visible to staff  - Offer Toileting every  Hours, in advance of need  - Initiate/Maintain alarm  - Obtain necessary fall risk management equipment  - Apply yellow socks and bracelet for high fall risk patients  - Consider moving patient to room near nurses station  3/17/2022 1037 by Kayden Finnegan, RN  Outcome: Progressing  3/17/2022 1036 by Kayden Finnegan, RN  Outcome: Progressing
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
## 2022-03-18 LAB
ANION GAP SERPL CALCULATED.3IONS-SCNC: 5 MMOL/L (ref 4–13)
BASOPHILS # BLD AUTO: 0.06 THOUSANDS/ΜL (ref 0–0.1)
BASOPHILS NFR BLD AUTO: 0 % (ref 0–1)
BUN SERPL-MCNC: 14 MG/DL (ref 5–25)
CALCIUM SERPL-MCNC: 8.3 MG/DL (ref 8.3–10.1)
CHLORIDE SERPL-SCNC: 102 MMOL/L (ref 100–108)
CO2 SERPL-SCNC: 27 MMOL/L (ref 21–32)
CREAT SERPL-MCNC: 0.65 MG/DL (ref 0.6–1.3)
EOSINOPHIL # BLD AUTO: 0.22 THOUSAND/ΜL (ref 0–0.61)
EOSINOPHIL NFR BLD AUTO: 1 % (ref 0–6)
ERYTHROCYTE [DISTWIDTH] IN BLOOD BY AUTOMATED COUNT: 13.9 % (ref 11.6–15.1)
GFR SERPL CREATININE-BSD FRML MDRD: 105 ML/MIN/1.73SQ M
GLUCOSE SERPL-MCNC: 111 MG/DL (ref 65–140)
HCT VFR BLD AUTO: 26 % (ref 36.5–49.3)
HGB BLD-MCNC: 8.9 G/DL (ref 12–17)
IMM GRANULOCYTES # BLD AUTO: 0.26 THOUSAND/UL (ref 0–0.2)
IMM GRANULOCYTES NFR BLD AUTO: 2 % (ref 0–2)
LYMPHOCYTES # BLD AUTO: 3.27 THOUSANDS/ΜL (ref 0.6–4.47)
LYMPHOCYTES NFR BLD AUTO: 19 % (ref 14–44)
MCH RBC QN AUTO: 29.5 PG (ref 26.8–34.3)
MCHC RBC AUTO-ENTMCNC: 34.2 G/DL (ref 31.4–37.4)
MCV RBC AUTO: 86 FL (ref 82–98)
MONOCYTES # BLD AUTO: 1.59 THOUSAND/ΜL (ref 0.17–1.22)
MONOCYTES NFR BLD AUTO: 9 % (ref 4–12)
NEUTROPHILS # BLD AUTO: 11.67 THOUSANDS/ΜL (ref 1.85–7.62)
NEUTS SEG NFR BLD AUTO: 69 % (ref 43–75)
NRBC BLD AUTO-RTO: 0 /100 WBCS
PLATELET # BLD AUTO: 167 THOUSANDS/UL (ref 149–390)
PMV BLD AUTO: 9.6 FL (ref 8.9–12.7)
POTASSIUM SERPL-SCNC: 3.7 MMOL/L (ref 3.5–5.3)
RBC # BLD AUTO: 3.02 MILLION/UL (ref 3.88–5.62)
SODIUM SERPL-SCNC: 134 MMOL/L (ref 136–145)
WBC # BLD AUTO: 17.07 THOUSAND/UL (ref 4.31–10.16)

## 2022-03-18 PROCEDURE — 85025 COMPLETE CBC W/AUTO DIFF WBC: CPT | Performed by: PHYSICIAN ASSISTANT

## 2022-03-18 PROCEDURE — 99232 SBSQ HOSP IP/OBS MODERATE 35: CPT | Performed by: SURGERY

## 2022-03-18 PROCEDURE — 80048 BASIC METABOLIC PNL TOTAL CA: CPT | Performed by: PHYSICIAN ASSISTANT

## 2022-03-18 PROCEDURE — NC001 PR NO CHARGE: Performed by: ORTHOPAEDIC SURGERY

## 2022-03-18 RX ORDER — METHOCARBAMOL 750 MG/1
750 TABLET, FILM COATED ORAL EVERY 6 HOURS SCHEDULED
Status: DISCONTINUED | OUTPATIENT
Start: 2022-03-18 | End: 2022-03-22 | Stop reason: HOSPADM

## 2022-03-18 RX ADMIN — HYDROMORPHONE HYDROCHLORIDE 1 MG: 1 INJECTION, SOLUTION INTRAMUSCULAR; INTRAVENOUS; SUBCUTANEOUS at 14:16

## 2022-03-18 RX ADMIN — ENOXAPARIN SODIUM 30 MG: 30 INJECTION SUBCUTANEOUS at 08:50

## 2022-03-18 RX ADMIN — NICOTINE 21 MG: 21 PATCH, EXTENDED RELEASE TRANSDERMAL at 21:08

## 2022-03-18 RX ADMIN — METHOCARBAMOL TABLETS 750 MG: 750 TABLET, COATED ORAL at 17:39

## 2022-03-18 RX ADMIN — LORATADINE AND PSEUDOEPHEDRINE 1 TABLET: 10; 240 TABLET, EXTENDED RELEASE ORAL at 08:46

## 2022-03-18 RX ADMIN — METHOCARBAMOL 500 MG: 500 TABLET ORAL at 05:17

## 2022-03-18 RX ADMIN — HYDROMORPHONE HYDROCHLORIDE 1 MG: 1 INJECTION, SOLUTION INTRAMUSCULAR; INTRAVENOUS; SUBCUTANEOUS at 17:39

## 2022-03-18 RX ADMIN — MORPHINE SULFATE 105 MG: 15 TABLET, FILM COATED, EXTENDED RELEASE ORAL at 20:42

## 2022-03-18 RX ADMIN — LEVOTHYROXINE SODIUM 150 MCG: 75 TABLET ORAL at 05:16

## 2022-03-18 RX ADMIN — METHOCARBAMOL TABLETS 750 MG: 750 TABLET, COATED ORAL at 11:15

## 2022-03-18 RX ADMIN — OXYCODONE HYDROCHLORIDE 15 MG: 10 TABLET ORAL at 11:15

## 2022-03-18 RX ADMIN — ACETAMINOPHEN 975 MG: 325 TABLET ORAL at 05:16

## 2022-03-18 RX ADMIN — MORPHINE SULFATE 105 MG: 15 TABLET, FILM COATED, EXTENDED RELEASE ORAL at 08:41

## 2022-03-18 RX ADMIN — OXYCODONE HYDROCHLORIDE 15 MG: 10 TABLET ORAL at 15:30

## 2022-03-18 RX ADMIN — ENOXAPARIN SODIUM 30 MG: 30 INJECTION SUBCUTANEOUS at 20:42

## 2022-03-18 RX ADMIN — NICOTINE 21 MG: 21 PATCH, EXTENDED RELEASE TRANSDERMAL at 08:56

## 2022-03-18 RX ADMIN — METHOCARBAMOL TABLETS 750 MG: 750 TABLET, COATED ORAL at 23:09
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
## 2022-03-18 NOTE — ASSESSMENT & PLAN NOTE
-resolved  --Tachycardic overnight on 03/16 to 160 bpm   This was with exertion and has since resolved  Likely pain related    - repeat CXR on 3/17 reveals no NANCY/PTX  - continue to encourage pulmonary toilette/chest PT
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
## 2022-03-18 NOTE — ASSESSMENT & PLAN NOTE
- Multiple bilateral rib fractures, present on admission  Patient with minimally displaced with the lateral 3rd rib fracture as well as nondisplaced right anterolateral 4-9 rib fractures, and nondisplaced left anterolateral 3rd, 5th and 8th rib fractures  - Continue rib fracture protocol   - Continue to encourage incentive spirometer use and adequate pulmonary hygiene  Currently pulling 1250 mL on I S  PIC score 6   - Continue multimodal analgesic regimen  APS evaluation and recommendations pending   - Supplemental oxygen via nasal cannula as needed to maintain saturations greater than or equal to 94%  Currently on 2 liters with oxygen saturations 97%  - Repeat chest x-ray on 3/17/2022 shows no delayed pneumothorax or hemothorax  - PT and OT evaluation and treatment as indicated  - Outpatient follow-up in the trauma clinic for re-evaluation in approximately 2 weeks
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
## 2022-03-18 NOTE — ASSESSMENT & PLAN NOTE
- much improved, patient has been weaned to 2 L nasal cannula    - Likely related to atelectasis and splinting from rib fractures/chest wall trauma  - repeat CXR on 3/17 reveals no NANCY/PTX  - continue aggressive pulmonary toilet/chest PT
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
## 2022-03-18 NOTE — ASSESSMENT & PLAN NOTE
- Acute left hip hematoma with active extravasation, present on presentation   - Monitor physical exam   - Hgb 8 9, down from 9 3-overall stable  This is likely related to postop blood loss  - lovenox started for DVT ppx  - Continue multimodal analgesic regimen   - Transfusion only as indicated

## 2022-03-19 PROBLEM — R00.0 SINUS TACHYCARDIA: Status: RESOLVED | Noted: 2022-03-17 | Resolved: 2022-03-19

## 2022-03-19 LAB
ANION GAP SERPL CALCULATED.3IONS-SCNC: 5 MMOL/L (ref 4–13)
BASOPHILS # BLD AUTO: 0.04 THOUSANDS/ΜL (ref 0–0.1)
BASOPHILS NFR BLD AUTO: 0 % (ref 0–1)
BUN SERPL-MCNC: 12 MG/DL (ref 5–25)
CALCIUM SERPL-MCNC: 8.1 MG/DL (ref 8.3–10.1)
CHLORIDE SERPL-SCNC: 103 MMOL/L (ref 100–108)
CO2 SERPL-SCNC: 28 MMOL/L (ref 21–32)
CREAT SERPL-MCNC: 0.55 MG/DL (ref 0.6–1.3)
EOSINOPHIL # BLD AUTO: 0.26 THOUSAND/ΜL (ref 0–0.61)
EOSINOPHIL NFR BLD AUTO: 2 % (ref 0–6)
ERYTHROCYTE [DISTWIDTH] IN BLOOD BY AUTOMATED COUNT: 14.6 % (ref 11.6–15.1)
GFR SERPL CREATININE-BSD FRML MDRD: 112 ML/MIN/1.73SQ M
GLUCOSE SERPL-MCNC: 98 MG/DL (ref 65–140)
HCT VFR BLD AUTO: 25.1 % (ref 36.5–49.3)
HGB BLD-MCNC: 8 G/DL (ref 12–17)
IMM GRANULOCYTES # BLD AUTO: 0.15 THOUSAND/UL (ref 0–0.2)
IMM GRANULOCYTES NFR BLD AUTO: 1 % (ref 0–2)
LYMPHOCYTES # BLD AUTO: 3.05 THOUSANDS/ΜL (ref 0.6–4.47)
LYMPHOCYTES NFR BLD AUTO: 25 % (ref 14–44)
MCH RBC QN AUTO: 29.5 PG (ref 26.8–34.3)
MCHC RBC AUTO-ENTMCNC: 31.9 G/DL (ref 31.4–37.4)
MCV RBC AUTO: 93 FL (ref 82–98)
MONOCYTES # BLD AUTO: 1.31 THOUSAND/ΜL (ref 0.17–1.22)
MONOCYTES NFR BLD AUTO: 11 % (ref 4–12)
NEUTROPHILS # BLD AUTO: 7.4 THOUSANDS/ΜL (ref 1.85–7.62)
NEUTS SEG NFR BLD AUTO: 61 % (ref 43–75)
NRBC BLD AUTO-RTO: 1 /100 WBCS
PLATELET # BLD AUTO: 177 THOUSANDS/UL (ref 149–390)
PMV BLD AUTO: 9.5 FL (ref 8.9–12.7)
POTASSIUM SERPL-SCNC: 3.5 MMOL/L (ref 3.5–5.3)
RBC # BLD AUTO: 2.71 MILLION/UL (ref 3.88–5.62)
SODIUM SERPL-SCNC: 136 MMOL/L (ref 136–145)
WBC # BLD AUTO: 12.21 THOUSAND/UL (ref 4.31–10.16)

## 2022-03-19 PROCEDURE — 99233 SBSQ HOSP IP/OBS HIGH 50: CPT | Performed by: SURGERY

## 2022-03-19 PROCEDURE — 80048 BASIC METABOLIC PNL TOTAL CA: CPT | Performed by: PHYSICIAN ASSISTANT

## 2022-03-19 PROCEDURE — NC001 PR NO CHARGE: Performed by: ORTHOPAEDIC SURGERY

## 2022-03-19 PROCEDURE — 85025 COMPLETE CBC W/AUTO DIFF WBC: CPT | Performed by: PHYSICIAN ASSISTANT

## 2022-03-19 RX ORDER — CALCIUM CARBONATE 200(500)MG
500 TABLET,CHEWABLE ORAL DAILY PRN
Status: DISCONTINUED | OUTPATIENT
Start: 2022-03-19 | End: 2022-03-22 | Stop reason: HOSPADM

## 2022-03-19 RX ORDER — HYDROMORPHONE HYDROCHLORIDE 4 MG/1
4 TABLET ORAL EVERY 4 HOURS PRN
Status: DISCONTINUED | OUTPATIENT
Start: 2022-03-19 | End: 2022-03-21

## 2022-03-19 RX ORDER — POTASSIUM CHLORIDE 20 MEQ/1
20 TABLET, EXTENDED RELEASE ORAL ONCE
Status: COMPLETED | OUTPATIENT
Start: 2022-03-19 | End: 2022-03-19

## 2022-03-19 RX ADMIN — ENOXAPARIN SODIUM 30 MG: 30 INJECTION SUBCUTANEOUS at 08:30

## 2022-03-19 RX ADMIN — LORATADINE AND PSEUDOEPHEDRINE 1 TABLET: 10; 240 TABLET, EXTENDED RELEASE ORAL at 08:37

## 2022-03-19 RX ADMIN — METHOCARBAMOL TABLETS 750 MG: 750 TABLET, COATED ORAL at 11:37

## 2022-03-19 RX ADMIN — MORPHINE SULFATE 105 MG: 15 TABLET, FILM COATED, EXTENDED RELEASE ORAL at 21:31

## 2022-03-19 RX ADMIN — ACETAMINOPHEN 975 MG: 325 TABLET ORAL at 13:10

## 2022-03-19 RX ADMIN — HYDROMORPHONE HYDROCHLORIDE 6 MG: 4 TABLET ORAL at 20:07

## 2022-03-19 RX ADMIN — Medication 500 MG: at 11:47

## 2022-03-19 RX ADMIN — MORPHINE SULFATE 105 MG: 15 TABLET, FILM COATED, EXTENDED RELEASE ORAL at 08:29

## 2022-03-19 RX ADMIN — ACETAMINOPHEN 975 MG: 325 TABLET ORAL at 21:30

## 2022-03-19 RX ADMIN — POTASSIUM CHLORIDE 20 MEQ: 1500 TABLET, EXTENDED RELEASE ORAL at 15:47

## 2022-03-19 RX ADMIN — LEVOTHYROXINE SODIUM 150 MCG: 75 TABLET ORAL at 06:35

## 2022-03-19 RX ADMIN — NICOTINE 21 MG: 21 PATCH, EXTENDED RELEASE TRANSDERMAL at 13:14

## 2022-03-19 RX ADMIN — METHOCARBAMOL TABLETS 750 MG: 750 TABLET, COATED ORAL at 06:35

## 2022-03-19 RX ADMIN — DOCUSATE SODIUM AND SENNOSIDES 2 TABLET: 8.6; 5 TABLET ORAL at 08:29

## 2022-03-19 RX ADMIN — HYDROMORPHONE HYDROCHLORIDE 6 MG: 4 TABLET ORAL at 11:37

## 2022-03-19 RX ADMIN — HYDROMORPHONE HYDROCHLORIDE 6 MG: 4 TABLET ORAL at 15:48

## 2022-03-19 RX ADMIN — METHOCARBAMOL TABLETS 750 MG: 750 TABLET, COATED ORAL at 17:26

## 2022-03-19 RX ADMIN — ENOXAPARIN SODIUM 30 MG: 30 INJECTION SUBCUTANEOUS at 20:12
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
## 2022-03-19 NOTE — ASSESSMENT & PLAN NOTE
- Acute left hip hematoma with active extravasation, present on presentation   - Monitor physical exam   - Hgb 8 0, overall stable  This is likely related to postop blood loss  No signs of bleeding clinically  - lovenox started for DVT ppx  - Continue multimodal analgesic regimen   - Transfusion only as indicated

## 2022-03-19 NOTE — ASSESSMENT & PLAN NOTE
- Acute oblique mid-body sternal fracture with associated subcutaneous and retrosternal hematomas, present on admission   - Continue rib fracture protocol   - Continue to encourage incentive spirometer use and adequate pulmonary hygiene  PIC score of 7   - Continue multimodal analgesic regimen  Appreciate APS evaluation and recommendations   - Supplemental oxygen via nasal cannula as needed to maintain saturations greater than or equal to 94%  Currently on room air   - ECHO completed 3/16, revealing EF 75%, no valvular disease  EKG overnight on 3/16 showed S  Tachycardia with rate of 160 bpm   - Repeat chest x-ray on 3/77 shows no PTX/NANCY     - PT and OT evaluation and treatment as indicated  - Outpatient follow-up in the trauma clinic for re-evaluation in approximately 2 weeks

## 2022-03-19 NOTE — ASSESSMENT & PLAN NOTE
- Acute left hip hematoma, present on presentation   - Monitor physical exam   - Hgb 8 0, overall stable  This is likely related to postop blood loss  No signs of bleeding clinically  - Lovenox started on DVT ppx  - multimodal analgesic regimen   - Transfusion only as indicated

## 2022-03-19 NOTE — ASSESSMENT & PLAN NOTE
- resolved  -Tachycardic overnight on 03/16 to 160 bpm   This was with exertion and has since resolved  Likely pain related    - repeat CXR on 3/17 reveals no NANCY/PTX  - continue to encourage pulmonary toilette/chest PT

## 2022-03-19 NOTE — PROGRESS NOTES
Progress Note - Orthopedics   Ashley Garcia 64 y o  male MRN: [id number]  [facility]      Subjective:    64 y o male POD 3 ORIF left distal femur fracture  No acute events, no complaints  Pt doing well  Pain controlled   Denies fevers chills, CP, SOB    Labs:  0   Lab Value Date/Time    HCT 26 0 (L) 03/18/2022 0528    HCT 27 2 (L) 03/17/2022 0438    HCT 31 3 (L) 03/16/2022 2318    HGB 8 9 (L) 03/18/2022 0528    HGB 9 3 (L) 03/17/2022 0438    HGB 10 3 (L) 03/16/2022 2318    INR 1 03 03/15/2022 1209    WBC 17 07 (H) 03/18/2022 0528    WBC 18 21 (H) 03/17/2022 0438    WBC 17 34 (H) 03/16/2022 1902       Meds:    Current Facility-Administered Medications:     acetaminophen (TYLENOL) tablet 975 mg, 975 mg, Oral, Q8H [address], Mikey Blanchard MD, 975 mg at 03/18/22 0516    bisacodyl (DULCOLAX) rectal suppository 10 mg, 10 mg, Rectal, Daily PRN, Mikey Blanchard MD    enoxaparin (LOVENOX) subcutaneous injection 30 mg, 30 mg, Subcutaneous, Q12H [address], Tiffanie Mathews PA-C, 30 mg at 03/18/22 2042    gabapentin (NEURONTIN) capsule 100 mg, 100 mg, Oral, TID, Mikey Blanchard MD, 100 mg at 03/15/22 2136    HYDROmorphone (DILAUDID) injection 1 mg, 1 mg, Intravenous, Q3H PRN, Tosha Villar MD, 1 mg at 03/18/22 1739    levothyroxine tablet 150 mcg, 150 mcg, Oral, Early Morning, Mikey Blanchard MD, 150 mcg at 03/19/22 0195    loratadine-pseudoephedrine (CLARITIN-D 24-HOUR)  mg per 24 hr tablet 1 tablet, 1 tablet, Oral, Daily, Tosha Villar MD, 1 tablet at 03/18/22 0846    methocarbamol (ROBAXIN) tablet 750 mg, 750 mg, Oral, Q6H [address], Tiffanie Mathews PA-C, 750 mg at 03/19/22 0692    morphine (MS CONTIN) ER tablet 105 mg, 105 mg, Oral, Q12H [address], MADISYN Thomas, 105 mg at 03/18/22 2042    naloxone (NARCAN) 0 04 mg/mL syringe 0 04 mg, 0 04 mg, Intravenous, Q1MIN PRN, Mikey Blanchard MD    nicotine (NICODERM CQ) 21 mg/24 hr TD 24 hr patch 21 mg, 21 mg, Transdermal, Daily, Tosha Cornejo MD Jian, 21 mg at 03/18/22 2108    ondansetron (ZOFRAN) injection 4 mg, 4 mg, Intravenous, Q4H PRN, Preeti Cho MD    oxyCODONE (ROXICODONE) IR tablet 15 mg, 15 mg, Oral, Q4H PRN, Temo Villar MD, 15 mg at 03/18/22 1530    oxyCODONE (ROXICODONE) IR tablet 7 5 mg, 7 5 mg, Oral, Q4H PRN, Temo Villar MD    polyethylene glycol (MIRALAX) packet 17 g, 17 g, Oral, Daily, Preeti Cho MD    senna-docusate sodium (SENOKOT S) 8 6-50 mg per tablet 2 tablet, 2 tablet, Oral, Daily, Preeti Cho MD    Blood Culture:   No results found for: BLOODCX    Wound Culture:   No results found for: WOUNDCULT    Ins and Outs:  I/O last 24 hours: In: 120 [P O :120]  Out: 850 [Urine:700; Drains:150]          Physical:  Vitals:    03/19/22 0236   BP: 133/71   Pulse: 92   Resp: 18   Temp: 98 4 °F (36 9 °C)   SpO2: 96%     Musculoskeletal: Left Lower Extremity  · Skin flaky, dry, intact  · Dressings clean, dry, intact  · HV in place  · Mild swelling about ankle  · TTP over knee and distal femur  · In a Knee Immobilizer, with LLE elevated on a pillow  · SILT throughout LLE  · Firing FHL/EHL  · Positive ankle DF/PF  · Tissues soft and compressible throughout entire LLE    Assessment:    61 y o male POD 3 ORIF left distal femur fracture       Plan:  · NWB LLE in KI  · PT/OT  · Pain control per Primary  · DVT ppx per Primary  · Dispo: Ortho will follow    Kanika Sanchez MD

## 2022-03-19 NOTE — ASSESSMENT & PLAN NOTE
- Multiple bilateral rib fractures, present on admission  Patient with minimally displaced with the lateral 3rd rib fracture as well as nondisplaced right anterolateral 4-9 rib fractures, and nondisplaced left anterolateral 3rd, 5th and 8th rib fractures  - Continue rib fracture protocol   - Continue to encourage incentive spirometer use and adequate pulmonary hygiene  Currently pulling 1500 mL on I S  PIC score much improved at 9 today  - Continue multimodal analgesic regimen  APS evaluation and recommendations appreciated  Patient is not interested in a ketamine infusion her regional block/epidural   - Supplemental oxygen via nasal cannula as needed to maintain saturations greater than or equal to 94%  Currently on room air - Repeat chest x-ray on 3/17/2022 shows no delayed pneumothorax or hemothorax  - PT and OT evaluation and treatment as indicated  - Outpatient follow-up in the trauma clinic for re-evaluation in approximately 2 weeks

## 2022-03-19 NOTE — ASSESSMENT & PLAN NOTE
- much improved/resolved, patient has been weaned off of oxygen this morning- Likely related to atelectasis and splinting from rib fractures/chest wall trauma  Pain control is much improved today    - repeat CXR on 3/17 reveals no NANCY/PTX  - continue aggressive pulmonary toilet/chest PT

## 2022-03-19 NOTE — [facility] ADMISSION
[facility] admissions team following patient's case  Per [facility] physician patient is a possible candidate pending medical stability/ pain control/ function  Awaiting auto clain/adjustor information  Admissions team will continue to monitor patient's case and follow up as able

## 2022-03-19 NOTE — ASSESSMENT & PLAN NOTE
- Acute pain secondary to multiple traumatic injuries  - Initiate multimodal analgesic regimen  - In setting of chronic pain and significant opiate use at baseline  Appreciate APS evaluation recommendations  Continue multimodal analgesic regimen with discontinuation of IV Dilaudid and transition of oxycodone to oral Dilaudid 4-6 mg p o  Every 4 hours as needed for moderate to severe pain  Patient finds improved relief with this regimen  Patient is declining ketamine infusion, nerve block or epidural catheter  - continue bowel regimen while on opioid therapy  Patient declines to clock suppository at this time despite encouragement  He reports having issues with chronic constipation and is confident that the regimen we have him on will work as it does at home

## 2022-03-19 NOTE — ASSESSMENT & PLAN NOTE
- Patient with history of chronic pain syndrome due to multiple prior spinal surgeries with his main issue being chronic back pain  - Patient with significant outpatient opioid use including morphine  mg every 12 hours and Percocet 10/325 mg every 6 hours as needed  - In setting of patient having acute pain secondary to multiple traumatic injuries as well as chronic pain and significant opiate use at baseline  Appreciate APS consult recommendations  Continue current regimen with discontinuation of IV Dilaudid and transition of oxycodone to oral Dilaudid 4-6 mg p o  Every 4 hours as needed for moderate to severe pain  Patient finds improved relief with this regimen     Patient is not interested in ketamine infusion or nerve block

## 2022-03-19 NOTE — PLAN OF CARE
Problem: RESPIRATORY - ADULT  Goal: Achieves optimal ventilation and oxygenation  Description: INTERVENTIONS:  - Assess for changes in respiratory status  - Assess for changes in mentation and behavior  - Position to facilitate oxygenation and minimize respiratory effort  - Oxygen administered by appropriate delivery if ordered  - Initiate smoking cessation education as indicated  - Encourage broncho-pulmonary hygiene including cough, deep breathe, Incentive Spirometry  - Assess the need for suctioning and aspirate as needed  - Assess and instruct to report SOB or any respiratory difficulty  - Respiratory Therapy support as indicated  Outcome: Progressing     Problem: HEMATOLOGIC - ADULT  Goal: Maintains hematologic stability  Description: INTERVENTIONS  - Assess for signs and symptoms of bleeding or hemorrhage  - Monitor labs  - Administer supportive blood products/factors as ordered and appropriate  Outcome: Progressing     Problem: MUSCULOSKELETAL - ADULT  Goal: Maintain proper alignment of affected body part  Description: INTERVENTIONS:  - Support, maintain and protect limb and body alignment  - Provide patient/ family with appropriate education  Outcome: Progressing     Problem: MOBILITY - ADULT  Goal: Maintain or return to baseline ADL function  Description: INTERVENTIONS:  -  Assess patient's ability to carry out ADLs; assess patient's baseline for ADL function and identify physical deficits which impact ability to perform ADLs (bathing, care of mouth/teeth, toileting, grooming, dressing, etc )  - Assess/evaluate cause of self-care deficits   - Assess range of motion  - Assess patient's mobility; develop plan if impaired  - Assess patient's need for assistive devices and provide as appropriate  - Encourage maximum independence but intervene and supervise when necessary  - Involve family in performance of ADLs  - Assess for home care needs following discharge   - Consider OT consult to assist with ADL evaluation and planning for discharge  - Provide patient education as appropriate  Outcome: Progressing  Goal: Maintains/Returns to pre admission functional level  Description: INTERVENTIONS:  - Perform BMAT or MOVE assessment daily    - Set and communicate daily mobility goal to care team and patient/family/caregiver  - Collaborate with rehabilitation services on mobility goals if consulted  - Perform Range of Motion   - Reposition patient every 2 hours    - Out of bed to chair  - Out of bed for meals   - Out of bed for toileting  - Record patient progress and toleration of activity level   Outcome: Progressing     Problem: Potential for Falls  Goal: Patient will remain free of falls  Description: INTERVENTIONS:  - Educate patient/family on patient safety including physical limitations  - Instruct patient to call for assistance with activity   - Consult OT/PT to assist with strengthening/mobility   - Keep Call bell within reach  - Keep bed low and locked with side rails adjusted as appropriate  - Keep care items and personal belongings within reach  - Initiate and maintain comfort rounds  - Make Fall Risk Sign visible to staff  - Offer Toileting every  Hours, in advance of need  - Initiate/Maintain alarm  - Obtain necessary fall risk management equipment  - Apply yellow socks and bracelet for high fall risk patients  - Consider moving patient to room near nurses station  Outcome: Progressing     Problem: Prexisting or High Potential for Compromised Skin Integrity  Goal: Skin integrity is maintained or improved  Description: INTERVENTIONS:  - Identify patients at risk for skin breakdown  - Assess and monitor skin integrity  - Assess and monitor nutrition and hydration status  - Monitor labs   - Assess for incontinence   - Turn and reposition patient  - Assist with mobility/ambulation  - Relieve pressure over bony prominences  - Avoid friction and shearing  - Provide appropriate hygiene as needed including keeping skin clean and dry  - Evaluate need for skin moisturizer/barrier cream  - Collaborate with interdisciplinary team   - Patient/family teaching  - Consider wound care consult   Outcome: Progressing     Problem: Nutrition/Hydration-ADULT  Goal: Nutrient/Hydration intake appropriate for improving, restoring or maintaining nutritional needs  Description: Monitor and assess patient's nutrition/hydration status for malnutrition  Collaborate with interdisciplinary team and initiate plan and interventions as ordered  Monitor patient's weight and dietary intake as ordered or per policy  Utilize nutrition screening tool and intervene as necessary  Determine patient's food preferences and provide high-protein, high-caloric foods as appropriate       INTERVENTIONS:  - Monitor oral intake, urinary output, labs, and treatment plans  - Assess nutrition and hydration status and recommend course of action  - Evaluate amount of meals eaten  - Assist patient with eating if necessary   - Allow adequate time for meals  - Recommend/ encourage appropriate diets, oral nutritional supplements, and vitamin/mineral supplements  - Order, calculate, and assess calorie counts as needed  - Recommend, monitor, and adjust tube feedings and TPN/PPN based on assessed needs  - Assess need for intravenous fluids  - Provide specific nutrition/hydration education as appropriate  - Include patient/family/caregiver in decisions related to nutrition  Outcome: Progressing

## 2022-03-20 LAB
ANION GAP SERPL CALCULATED.3IONS-SCNC: 7 MMOL/L (ref 4–13)
BUN SERPL-MCNC: 13 MG/DL (ref 5–25)
CALCIUM SERPL-MCNC: 8.5 MG/DL (ref 8.3–10.1)
CHLORIDE SERPL-SCNC: 105 MMOL/L (ref 100–108)
CO2 SERPL-SCNC: 26 MMOL/L (ref 21–32)
CREAT SERPL-MCNC: 0.58 MG/DL (ref 0.6–1.3)
ERYTHROCYTE [DISTWIDTH] IN BLOOD BY AUTOMATED COUNT: 14.7 % (ref 11.6–15.1)
GFR SERPL CREATININE-BSD FRML MDRD: 110 ML/MIN/1.73SQ M
GLUCOSE SERPL-MCNC: 122 MG/DL (ref 65–140)
HCT VFR BLD AUTO: 26 % (ref 36.5–49.3)
HGB BLD-MCNC: 8.6 G/DL (ref 12–17)
MCH RBC QN AUTO: 30 PG (ref 26.8–34.3)
MCHC RBC AUTO-ENTMCNC: 33.1 G/DL (ref 31.4–37.4)
MCV RBC AUTO: 91 FL (ref 82–98)
PLATELET # BLD AUTO: 222 THOUSANDS/UL (ref 149–390)
PMV BLD AUTO: 9.2 FL (ref 8.9–12.7)
POTASSIUM SERPL-SCNC: 3.5 MMOL/L (ref 3.5–5.3)
RBC # BLD AUTO: 2.87 MILLION/UL (ref 3.88–5.62)
SODIUM SERPL-SCNC: 138 MMOL/L (ref 136–145)
WBC # BLD AUTO: 13.93 THOUSAND/UL (ref 4.31–10.16)

## 2022-03-20 PROCEDURE — 99232 SBSQ HOSP IP/OBS MODERATE 35: CPT | Performed by: SURGERY

## 2022-03-20 PROCEDURE — 85027 COMPLETE CBC AUTOMATED: CPT

## 2022-03-20 PROCEDURE — 97530 THERAPEUTIC ACTIVITIES: CPT

## 2022-03-20 PROCEDURE — NC001 PR NO CHARGE: Performed by: ORTHOPAEDIC SURGERY

## 2022-03-20 PROCEDURE — 80048 BASIC METABOLIC PNL TOTAL CA: CPT

## 2022-03-20 PROCEDURE — 97535 SELF CARE MNGMENT TRAINING: CPT

## 2022-03-20 RX ADMIN — MORPHINE SULFATE 105 MG: 15 TABLET, FILM COATED, EXTENDED RELEASE ORAL at 21:27

## 2022-03-20 RX ADMIN — METHOCARBAMOL TABLETS 750 MG: 750 TABLET, COATED ORAL at 17:20

## 2022-03-20 RX ADMIN — HYDROMORPHONE HYDROCHLORIDE 6 MG: 4 TABLET ORAL at 12:12

## 2022-03-20 RX ADMIN — ENOXAPARIN SODIUM 30 MG: 30 INJECTION SUBCUTANEOUS at 08:26

## 2022-03-20 RX ADMIN — METHOCARBAMOL TABLETS 750 MG: 750 TABLET, COATED ORAL at 00:56

## 2022-03-20 RX ADMIN — LEVOTHYROXINE SODIUM 150 MCG: 75 TABLET ORAL at 05:54

## 2022-03-20 RX ADMIN — ACETAMINOPHEN 975 MG: 325 TABLET ORAL at 15:06

## 2022-03-20 RX ADMIN — ACETAMINOPHEN 975 MG: 325 TABLET ORAL at 21:21

## 2022-03-20 RX ADMIN — NICOTINE 21 MG: 21 PATCH, EXTENDED RELEASE TRANSDERMAL at 15:09

## 2022-03-20 RX ADMIN — HYDROMORPHONE HYDROCHLORIDE 6 MG: 4 TABLET ORAL at 17:20

## 2022-03-20 RX ADMIN — METHOCARBAMOL TABLETS 750 MG: 750 TABLET, COATED ORAL at 05:54

## 2022-03-20 RX ADMIN — ENOXAPARIN SODIUM 30 MG: 30 INJECTION SUBCUTANEOUS at 21:21

## 2022-03-20 RX ADMIN — MORPHINE SULFATE 105 MG: 15 TABLET, FILM COATED, EXTENDED RELEASE ORAL at 08:24

## 2022-03-20 RX ADMIN — LORATADINE AND PSEUDOEPHEDRINE 1 TABLET: 10; 240 TABLET, EXTENDED RELEASE ORAL at 08:25

## 2022-03-20 RX ADMIN — METHOCARBAMOL TABLETS 750 MG: 750 TABLET, COATED ORAL at 12:12

## 2022-03-20 RX ADMIN — ACETAMINOPHEN 975 MG: 325 TABLET ORAL at 05:54

## 2022-03-20 RX ADMIN — HYDROMORPHONE HYDROCHLORIDE 6 MG: 4 TABLET ORAL at 03:47

## 2022-03-20 RX ADMIN — HYDROMORPHONE HYDROCHLORIDE 6 MG: 4 TABLET ORAL at 08:24

## 2022-03-20 NOTE — ASSESSMENT & PLAN NOTE
- Abrasions of multiple sites including the lower anterior chest wall, lower abdominal wall, and left lower extremity   - Local wound care as indicated  - Tetanus vaccination status updated  - Analgesia as needed   -apply bacitracin to lower abdominal b i d  agitation/med management

## 2022-03-20 NOTE — PLAN OF CARE
Problem: PHYSICAL THERAPY ADULT  Goal: Performs mobility at highest level of function for planned discharge setting  See evaluation for individualized goals  Description: Treatment/Interventions: Functional transfer training,LE strengthening/ROM,Therapeutic exercise,Endurance training,Patient/family training,Equipment eval/education,Bed mobility,Continued evaluation,Spoke to nursing,OT  Equipment Recommended: Wheelchair       See flowsheet documentation for full assessment, interventions and recommendations  Outcome: Progressing  Note: Prognosis: Good  Problem List: Decreased strength,Decreased range of motion,Decreased endurance,Impaired balance,Decreased mobility,Decreased safety awareness,Pain,Orthopedic restrictions  Assessment: Pt continues to remain limited by pain & associated weakness   Pt was able to perform bed mobilty & sit pivot transfer with reduced assist compared to previous OOB attempt, but pt declined standing trial once in recliner due to R ankle pain  Pt's family was present during session & was instructed to bring shoe in for RLE to  allow for improved ankle stability & clearance of LLE in preparation for sit to stand & pivot trials in upcoming sessions  Pt remains appropraite for rehab at this time to progress funcitonal independence with these tasks as well as w/c vs RW mobiitly to ensure safe mobitly in home & community  Barriers to Discharge: Inaccessible home environment        PT Discharge Recommendation: Post acute rehabilitation services          See flowsheet documentation for full assessment
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
## 2022-03-20 NOTE — ASSESSMENT & PLAN NOTE
- Acute oblique mid-body sternal fracture with associated subcutaneous and retrosternal hematomas, present on admission   - Continue rib fracture protocol   - Continue to encourage incentive spirometer use and adequate pulmonary hygiene  PIC score of 7   - Continue multimodal analgesic regimen  Appreciate APS evaluation and recommendations   - Supplemental oxygen via nasal cannula as needed to maintain saturations greater than or equal to 94%  Currently on room air   - ECHO completed 3/16, revealing EF 75%, no valvular disease    - Repeat chest x-ray on 3/17 shows no PTX/NANCY     - PT and OT evaluation and treatment as indicated  - Outpatient follow-up in the trauma clinic for re-evaluation in approximately 2 weeks

## 2022-03-20 NOTE — PLAN OF CARE
Problem: OCCUPATIONAL THERAPY ADULT  Goal: Performs self-care activities at highest level of function for planned discharge setting  See evaluation for individualized goals  Description: Treatment Interventions: ADL retraining,Functional transfer training,UE strengthening/ROM,Endurance training,Cognitive reorientation,Patient/family training,Equipment evaluation/education,Compensatory technique education,Continued evaluation,Energy conservation,Activityengagement          See flowsheet documentation for full assessment, interventions and recommendations  Outcome: Progressing  Note: Limitation: Decreased ADL status,Decreased UE ROM,Decreased UE strength,Decreased Safe judgement during ADL,Decreased cognition,Decreased endurance,Decreased self-care trans,Decreased high-level ADLs  Prognosis: Fair  Assessment: Pt was seen this date for OT tx session focusing on self care tasks, bed mobility, sitting balance and tolerance, sit pivot transfers, safety awareness, compensatory techniques, energy conservation, and overall activity tolerance  Pt presents supine vital signs WNL at rest and with activity, completes previously mentioned tasks at documented assist levels please see above in flow sheet  Pt continues to require overall mod A x 2 for transfers and mobility and S- max A for self care tasks  Pt is overall limited by decreased safety awareness, increased fatigue, decreased strength, endurance, and activity tolerance and continues to function below baseline level  Pt resting OOB in chair at end of session with all needs in reach and alarm on  Pt would benefit from continued OT Tx to improve overall functional abilities and increase independence  Will continue to follow with current POC  OT Discharge Recommendation: Post acute rehabilitation services  OT - OK to Discharge:  Yes
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
## 2022-03-20 NOTE — ASSESSMENT & PLAN NOTE
- much improved/resolved, patient has been weaned off of oxygen this morning  - Likely related to atelectasis and splinting from rib fractures/chest wall trauma  Pain control is much improved today    - repeat CXR on 3/17 reveals no NANCY/PTX  - continue aggressive pulmonary toilet/chest PT
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
## 2022-03-20 NOTE — ASSESSMENT & PLAN NOTE
- Acute left hip hematoma, present on presentation   - Monitor physical exam   - Hgb 8 0, overall stable  This is likely related to postop blood loss  No signs of bleeding clinically  - Lovenox started for DVT ppx  - multimodal analgesic regimen   - Transfusion only as indicated
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
## 2022-03-21 ENCOUNTER — APPOINTMENT (INPATIENT)
Dept: RADIOLOGY | Facility: HOSPITAL | Age: 62
DRG: 481 | End: 2022-03-21
Payer: COMMERCIAL

## 2022-03-21 LAB
ANISOCYTOSIS BLD QL SMEAR: PRESENT
ARTIFACT: PRESENT
BASOPHILS # BLD MANUAL: 0 THOUSAND/UL (ref 0–0.1)
BASOPHILS NFR MAR MANUAL: 0 % (ref 0–1)
EOSINOPHIL # BLD MANUAL: 0.52 THOUSAND/UL (ref 0–0.4)
EOSINOPHIL NFR BLD MANUAL: 4 % (ref 0–6)
ERYTHROCYTE [DISTWIDTH] IN BLOOD BY AUTOMATED COUNT: 15 % (ref 11.6–15.1)
FLUAV RNA RESP QL NAA+PROBE: NEGATIVE
FLUBV RNA RESP QL NAA+PROBE: NEGATIVE
HCT VFR BLD AUTO: 27.2 % (ref 36.5–49.3)
HGB BLD-MCNC: 8.8 G/DL (ref 12–17)
LYMPHOCYTES # BLD AUTO: 2.59 THOUSAND/UL (ref 0.6–4.47)
LYMPHOCYTES # BLD AUTO: 20 % (ref 14–44)
MCH RBC QN AUTO: 29.9 PG (ref 26.8–34.3)
MCHC RBC AUTO-ENTMCNC: 32.4 G/DL (ref 31.4–37.4)
MCV RBC AUTO: 93 FL (ref 82–98)
MONOCYTES # BLD AUTO: 0.78 THOUSAND/UL (ref 0–1.22)
MONOCYTES NFR BLD: 6 % (ref 4–12)
MYELOCYTES NFR BLD MANUAL: 1 % (ref 0–1)
NEUTROPHILS # BLD MANUAL: 8.92 THOUSAND/UL (ref 1.85–7.62)
NEUTS SEG NFR BLD AUTO: 69 % (ref 43–75)
NRBC BLD AUTO-RTO: 1 /100 WBC (ref 0–2)
PLATELET # BLD AUTO: 250 THOUSANDS/UL (ref 149–390)
PLATELET BLD QL SMEAR: ADEQUATE
PMV BLD AUTO: 9.3 FL (ref 8.9–12.7)
POIKILOCYTOSIS BLD QL SMEAR: PRESENT
POLYCHROMASIA BLD QL SMEAR: PRESENT
RBC # BLD AUTO: 2.94 MILLION/UL (ref 3.88–5.62)
RBC MORPH BLD: PRESENT
RSV RNA RESP QL NAA+PROBE: NEGATIVE
SARS-COV-2 RNA RESP QL NAA+PROBE: NEGATIVE
TARGETS BLD QL SMEAR: PRESENT
WBC # BLD AUTO: 12.93 THOUSAND/UL (ref 4.31–10.16)

## 2022-03-21 PROCEDURE — 73630 X-RAY EXAM OF FOOT: CPT

## 2022-03-21 PROCEDURE — 85007 BL SMEAR W/DIFF WBC COUNT: CPT | Performed by: PHYSICIAN ASSISTANT

## 2022-03-21 PROCEDURE — 0241U HB NFCT DS VIR RESP RNA 4 TRGT: CPT | Performed by: PHYSICIAN ASSISTANT

## 2022-03-21 PROCEDURE — 85027 COMPLETE CBC AUTOMATED: CPT | Performed by: PHYSICIAN ASSISTANT

## 2022-03-21 PROCEDURE — 99233 SBSQ HOSP IP/OBS HIGH 50: CPT | Performed by: STUDENT IN AN ORGANIZED HEALTH CARE EDUCATION/TRAINING PROGRAM

## 2022-03-21 RX ORDER — OXYCODONE HYDROCHLORIDE 10 MG/1
10 TABLET ORAL EVERY 4 HOURS PRN
Status: DISCONTINUED | OUTPATIENT
Start: 2022-03-21 | End: 2022-03-22 | Stop reason: HOSPADM

## 2022-03-21 RX ADMIN — METHOCARBAMOL TABLETS 750 MG: 750 TABLET, COATED ORAL at 06:20

## 2022-03-21 RX ADMIN — METHOCARBAMOL TABLETS 750 MG: 750 TABLET, COATED ORAL at 23:36

## 2022-03-21 RX ADMIN — METHOCARBAMOL TABLETS 750 MG: 750 TABLET, COATED ORAL at 11:02

## 2022-03-21 RX ADMIN — METHOCARBAMOL TABLETS 750 MG: 750 TABLET, COATED ORAL at 18:21

## 2022-03-21 RX ADMIN — MORPHINE SULFATE 105 MG: 15 TABLET, FILM COATED, EXTENDED RELEASE ORAL at 08:48

## 2022-03-21 RX ADMIN — HYDROMORPHONE HYDROCHLORIDE 6 MG: 4 TABLET ORAL at 06:23

## 2022-03-21 RX ADMIN — METHOCARBAMOL TABLETS 750 MG: 750 TABLET, COATED ORAL at 00:49

## 2022-03-21 RX ADMIN — OXYCODONE HYDROCHLORIDE 15 MG: 10 TABLET ORAL at 18:21

## 2022-03-21 RX ADMIN — LEVOTHYROXINE SODIUM 150 MCG: 75 TABLET ORAL at 06:20

## 2022-03-21 RX ADMIN — ACETAMINOPHEN 975 MG: 325 TABLET ORAL at 21:36

## 2022-03-21 RX ADMIN — ACETAMINOPHEN 975 MG: 325 TABLET ORAL at 06:20

## 2022-03-21 RX ADMIN — ENOXAPARIN SODIUM 30 MG: 30 INJECTION SUBCUTANEOUS at 21:36

## 2022-03-21 RX ADMIN — OXYCODONE HYDROCHLORIDE 15 MG: 10 TABLET ORAL at 13:46

## 2022-03-21 RX ADMIN — GABAPENTIN 100 MG: 100 CAPSULE ORAL at 21:36

## 2022-03-21 RX ADMIN — ENOXAPARIN SODIUM 30 MG: 30 INJECTION SUBCUTANEOUS at 08:51

## 2022-03-21 RX ADMIN — NICOTINE 21 MG: 21 PATCH, EXTENDED RELEASE TRANSDERMAL at 14:32

## 2022-03-21 RX ADMIN — LORATADINE AND PSEUDOEPHEDRINE 1 TABLET: 10; 240 TABLET, EXTENDED RELEASE ORAL at 08:49

## 2022-03-21 RX ADMIN — HYDROMORPHONE HYDROCHLORIDE 6 MG: 4 TABLET ORAL at 10:58

## 2022-03-21 RX ADMIN — MORPHINE SULFATE 105 MG: 15 TABLET, FILM COATED, EXTENDED RELEASE ORAL at 21:35

## 2022-03-21 RX ADMIN — ACETAMINOPHEN 975 MG: 325 TABLET ORAL at 13:46
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
## 2022-03-21 NOTE — ARC ADMISSION
ARC Admission Liaison left voice mail for Courtney Adjustor Sam Liu to confirm claim information  Reviewed patients case with [facility] physician, patient is approved for ARC pending: patient is on all PO pain medications, Covid test and conformation of claim information with Adjustor  Tomorrow patient can admit into [address] [place] [address], with a transport time of 10:00 AM, for Nurse report please call [phone]  Provided update with same to ROSHNI WIGGINS  [email or web address]
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
## 2022-03-21 NOTE — ASSESSMENT & PLAN NOTE
- Acute left hip hematoma, present on presentation   - Monitor physical exam   - Hgb 8 8, overall stable  This is likely related to postop blood loss  No signs of bleeding clinically  - Lovenox started for DVT ppx  - multimodal analgesic regimen   - Transfusion only as indicated
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
## 2022-03-21 NOTE — ASSESSMENT & PLAN NOTE
- Acute left hip hematoma with active extravasation, present on presentation   - Monitor physical exam   - Hgb 8 8, overall stable  This is likely related to postop blood loss  No signs of bleeding clinically  - lovenox started for DVT ppx  - Continue multimodal analgesic regimen   - Transfusion only as indicated
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
## 2022-03-22 ENCOUNTER — HOSPITAL ENCOUNTER (INPATIENT)
Facility: HOSPITAL | Age: 62
LOS: 7 days | Discharge: HOME | DRG: 560 | End: 2022-03-29
Attending: STUDENT IN AN ORGANIZED HEALTH CARE EDUCATION/TRAINING PROGRAM | Admitting: STUDENT IN AN ORGANIZED HEALTH CARE EDUCATION/TRAINING PROGRAM
Payer: MEDICARE

## 2022-03-22 VITALS
HEIGHT: 69 IN | TEMPERATURE: 98.3 F | SYSTOLIC BLOOD PRESSURE: 115 MMHG | WEIGHT: 261 LBS | OXYGEN SATURATION: 96 % | DIASTOLIC BLOOD PRESSURE: 66 MMHG | HEART RATE: 81 BPM | BODY MASS INDEX: 38.66 KG/M2 | RESPIRATION RATE: 19 BRPM

## 2022-03-22 DIAGNOSIS — R09.02 HYPOXIA: Primary | ICD-10-CM

## 2022-03-22 DIAGNOSIS — S70.02XA HEMATOMA OF LEFT HIP, INITIAL ENCOUNTER: ICD-10-CM

## 2022-03-22 DIAGNOSIS — J30.2 SEASONAL ALLERGIES: ICD-10-CM

## 2022-03-22 DIAGNOSIS — S72.402A CLOSED FRACTURE OF DISTAL END OF LEFT FEMUR, UNSPECIFIED FRACTURE MORPHOLOGY, INITIAL ENCOUNTER (HCC): ICD-10-CM

## 2022-03-22 DIAGNOSIS — E66.09 CLASS 2 OBESITY DUE TO EXCESS CALORIES WITHOUT SERIOUS COMORBIDITY WITH BODY MASS INDEX (BMI) OF 35.0 TO 35.9 IN ADULT: ICD-10-CM
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
## 2022-03-22 DIAGNOSIS — K21.9 GASTROESOPHAGEAL REFLUX DISEASE WITHOUT ESOPHAGITIS: ICD-10-CM

## 2022-03-22 DIAGNOSIS — E03.9 HYPOTHYROIDISM, UNSPECIFIED TYPE: ICD-10-CM

## 2022-03-22 DIAGNOSIS — S22.43XA CLOSED FRACTURE OF MULTIPLE RIBS OF BOTH SIDES, INITIAL ENCOUNTER: ICD-10-CM

## 2022-03-22 DIAGNOSIS — S70.01XA HEMATOMA OF RIGHT HIP, INITIAL ENCOUNTER: ICD-10-CM

## 2022-03-22 DIAGNOSIS — B35.1 ONYCHOMYCOSIS: ICD-10-CM

## 2022-03-22 DIAGNOSIS — G89.11 ACUTE PAIN DUE TO TRAUMA: ICD-10-CM

## 2022-03-22 DIAGNOSIS — Z72.0 TOBACCO ABUSE: ICD-10-CM

## 2022-03-22 DIAGNOSIS — S22.20XA STERNAL FRACTURE WITH RETROSTERNAL CONTUSION, CLOSED, INITIAL ENCOUNTER: ICD-10-CM

## 2022-03-22 DIAGNOSIS — S30.1XXA CONTUSION OF ABDOMINAL WALL, INITIAL ENCOUNTER: ICD-10-CM

## 2022-03-22 PROBLEM — S99.911A RIGHT ANKLE INJURY: Status: ACTIVE | Noted: 2022-03-22

## 2022-03-22 PROBLEM — K59.03 DRUG-INDUCED CONSTIPATION: Status: ACTIVE | Noted: 2022-03-22

## 2022-03-22 PROBLEM — D64.9 ANEMIA: Status: ACTIVE | Noted: 2022-03-22

## 2022-03-22 PROBLEM — D72.829 LEUKOCYTOSIS: Status: ACTIVE | Noted: 2022-03-22

## 2022-03-22 PROCEDURE — 99223 1ST HOSP IP/OBS HIGH 75: CPT | Performed by: STUDENT IN AN ORGANIZED HEALTH CARE EDUCATION/TRAINING PROGRAM

## 2022-03-22 PROCEDURE — 99223 1ST HOSP IP/OBS HIGH 75: CPT | Performed by: INTERNAL MEDICINE

## 2022-03-22 PROCEDURE — NC001 PR NO CHARGE

## 2022-03-22 PROCEDURE — 99238 HOSP IP/OBS DSCHRG MGMT 30/<: CPT | Performed by: NURSE PRACTITIONER

## 2022-03-22 RX ORDER — ONDANSETRON 2 MG/ML
4 INJECTION INTRAMUSCULAR; INTRAVENOUS EVERY 4 HOURS PRN
Status: CANCELLED | OUTPATIENT
Start: 2022-03-22

## 2022-03-22 RX ORDER — NICOTINE 21 MG/24HR
21 PATCH, TRANSDERMAL 24 HOURS TRANSDERMAL DAILY
Status: CANCELLED | OUTPATIENT
Start: 2022-03-22

## 2022-03-22 RX ORDER — AMOXICILLIN 250 MG
2 CAPSULE ORAL DAILY
Status: CANCELLED | OUTPATIENT
Start: 2022-03-22

## 2022-03-22 RX ORDER — CALCIUM CARBONATE 200(500)MG
500 TABLET,CHEWABLE ORAL DAILY PRN
Status: DISCONTINUED | OUTPATIENT
Start: 2022-03-22 | End: 2022-03-29 | Stop reason: HOSPADM

## 2022-03-22 RX ORDER — LEVOTHYROXINE SODIUM 0.07 MG/1
150 TABLET ORAL
Status: CANCELLED | OUTPATIENT
Start: 2022-03-23

## 2022-03-22 RX ORDER — DOCUSATE SODIUM 100 MG/1
100 CAPSULE, LIQUID FILLED ORAL 2 TIMES DAILY
Status: DISCONTINUED | OUTPATIENT
Start: 2022-03-22 | End: 2022-03-29 | Stop reason: HOSPADM

## 2022-03-22 RX ORDER — METHOCARBAMOL 750 MG/1
750 TABLET, FILM COATED ORAL EVERY 6 HOURS SCHEDULED
Status: DISCONTINUED | OUTPATIENT
Start: 2022-03-22 | End: 2022-03-29 | Stop reason: HOSPADM

## 2022-03-22 RX ORDER — LORATADINE AND PSEUDOEPHEDRINE 10; 240 MG/1; MG/1
1 TABLET, EXTENDED RELEASE ORAL DAILY
Status: DISCONTINUED | OUTPATIENT
Start: 2022-03-23 | End: 2022-03-29 | Stop reason: HOSPADM

## 2022-03-22 RX ORDER — CALCIUM CARBONATE 200(500)MG
500 TABLET,CHEWABLE ORAL DAILY PRN
Status: CANCELLED | OUTPATIENT
Start: 2022-03-22

## 2022-03-22 RX ORDER — METHOCARBAMOL 750 MG/1
750 TABLET, FILM COATED ORAL EVERY 6 HOURS SCHEDULED
Status: CANCELLED | OUTPATIENT
Start: 2022-03-22

## 2022-03-22 RX ORDER — POLYETHYLENE GLYCOL 3350 17 G/17G
17 POWDER, FOR SOLUTION ORAL DAILY
Status: DISCONTINUED | OUTPATIENT
Start: 2022-03-23 | End: 2022-03-29 | Stop reason: HOSPADM

## 2022-03-22 RX ORDER — NICOTINE 21 MG/24HR
21 PATCH, TRANSDERMAL 24 HOURS TRANSDERMAL DAILY
Status: DISCONTINUED | OUTPATIENT
Start: 2022-03-22 | End: 2022-03-29 | Stop reason: HOSPADM

## 2022-03-22 RX ORDER — OXYCODONE HYDROCHLORIDE 10 MG/1
10 TABLET ORAL EVERY 4 HOURS PRN
Status: CANCELLED | OUTPATIENT
Start: 2022-03-22

## 2022-03-22 RX ORDER — OXYCODONE HYDROCHLORIDE 10 MG/1
10 TABLET ORAL EVERY 4 HOURS PRN
Status: DISCONTINUED | OUTPATIENT
Start: 2022-03-22 | End: 2022-03-29 | Stop reason: HOSPADM

## 2022-03-22 RX ORDER — POLYETHYLENE GLYCOL 3350 17 G/17G
17 POWDER, FOR SOLUTION ORAL DAILY
Status: CANCELLED | OUTPATIENT
Start: 2022-03-22

## 2022-03-22 RX ORDER — ACETAMINOPHEN 325 MG/1
975 TABLET ORAL EVERY 8 HOURS SCHEDULED
Status: DISCONTINUED | OUTPATIENT
Start: 2022-03-22 | End: 2022-03-29 | Stop reason: HOSPADM

## 2022-03-22 RX ORDER — GABAPENTIN 100 MG/1
100 CAPSULE ORAL 3 TIMES DAILY
Status: DISCONTINUED | OUTPATIENT
Start: 2022-03-22 | End: 2022-03-23

## 2022-03-22 RX ORDER — MAGNESIUM CARB/ALUMINUM HYDROX 105-160MG
148 TABLET,CHEWABLE ORAL ONCE
Status: DISCONTINUED | OUTPATIENT
Start: 2022-03-22 | End: 2022-03-29 | Stop reason: HOSPADM

## 2022-03-22 RX ORDER — LACTULOSE 20 G/30ML
20 SOLUTION ORAL DAILY PRN
Status: DISCONTINUED | OUTPATIENT
Start: 2022-03-22 | End: 2022-03-29 | Stop reason: HOSPADM

## 2022-03-22 RX ORDER — LORATADINE AND PSEUDOEPHEDRINE 10; 240 MG/1; MG/1
1 TABLET, EXTENDED RELEASE ORAL DAILY
Status: CANCELLED | OUTPATIENT
Start: 2022-03-22

## 2022-03-22 RX ORDER — BISACODYL 10 MG
10 SUPPOSITORY, RECTAL RECTAL DAILY PRN
Status: CANCELLED | OUTPATIENT
Start: 2022-03-22

## 2022-03-22 RX ORDER — BISACODYL 10 MG
10 SUPPOSITORY, RECTAL RECTAL DAILY PRN
Status: DISCONTINUED | OUTPATIENT
Start: 2022-03-22 | End: 2022-03-23

## 2022-03-22 RX ORDER — SENNOSIDES 8.6 MG
2 TABLET ORAL DAILY
Status: DISCONTINUED | OUTPATIENT
Start: 2022-03-23 | End: 2022-03-29 | Stop reason: HOSPADM

## 2022-03-22 RX ORDER — GABAPENTIN 100 MG/1
100 CAPSULE ORAL 3 TIMES DAILY
Status: CANCELLED | OUTPATIENT
Start: 2022-03-22

## 2022-03-22 RX ORDER — LEVOTHYROXINE SODIUM 0.07 MG/1
150 TABLET ORAL
Status: DISCONTINUED | OUTPATIENT
Start: 2022-03-23 | End: 2022-03-29 | Stop reason: HOSPADM

## 2022-03-22 RX ORDER — ACETAMINOPHEN 325 MG/1
975 TABLET ORAL EVERY 8 HOURS SCHEDULED
Status: CANCELLED | OUTPATIENT
Start: 2022-03-22

## 2022-03-22 RX ORDER — PANTOPRAZOLE SODIUM 40 MG/1
40 TABLET, DELAYED RELEASE ORAL
Status: DISCONTINUED | OUTPATIENT
Start: 2022-03-23 | End: 2022-03-29 | Stop reason: HOSPADM

## 2022-03-22 RX ADMIN — LORATADINE AND PSEUDOEPHEDRINE 1 TABLET: 10; 240 TABLET, EXTENDED RELEASE ORAL at 08:09

## 2022-03-22 RX ADMIN — ACETAMINOPHEN 975 MG: 325 TABLET ORAL at 06:09

## 2022-03-22 RX ADMIN — MORPHINE SULFATE 105 MG: 15 TABLET, FILM COATED, EXTENDED RELEASE ORAL at 20:09

## 2022-03-22 RX ADMIN — ENOXAPARIN SODIUM 30 MG: 30 INJECTION SUBCUTANEOUS at 20:09

## 2022-03-22 RX ADMIN — OXYCODONE HYDROCHLORIDE 15 MG: 10 TABLET ORAL at 17:28

## 2022-03-22 RX ADMIN — OXYCODONE HYDROCHLORIDE 15 MG: 10 TABLET ORAL at 13:26

## 2022-03-22 RX ADMIN — DICLOFENAC SODIUM 2 G: 10 GEL TOPICAL at 20:11

## 2022-03-22 RX ADMIN — ACETAMINOPHEN 975 MG: 325 TABLET ORAL at 13:25

## 2022-03-22 RX ADMIN — ENOXAPARIN SODIUM 30 MG: 30 INJECTION SUBCUTANEOUS at 08:14

## 2022-03-22 RX ADMIN — METHOCARBAMOL TABLETS 750 MG: 750 TABLET, COATED ORAL at 17:20

## 2022-03-22 RX ADMIN — MORPHINE SULFATE 105 MG: 15 TABLET, FILM COATED, EXTENDED RELEASE ORAL at 08:09

## 2022-03-22 RX ADMIN — LEVOTHYROXINE SODIUM 150 MCG: 75 TABLET ORAL at 06:09

## 2022-03-22 RX ADMIN — METHOCARBAMOL TABLETS 750 MG: 750 TABLET, COATED ORAL at 06:09

## 2022-03-22 RX ADMIN — OXYCODONE HYDROCHLORIDE 15 MG: 10 TABLET ORAL at 06:10

## 2022-03-22 RX ADMIN — NICOTINE 21 MG: 21 PATCH, EXTENDED RELEASE TRANSDERMAL at 20:09

## 2022-03-22 RX ADMIN — METHOCARBAMOL TABLETS 750 MG: 750 TABLET, COATED ORAL at 13:25
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
## 2022-03-22 NOTE — ARC ADMISSION
Met with patient and his son at bedside: introduced self, explained role, ARC program/services, transfer time to rehab unit, anticipated rehab length of stay and rehab program booklet  [facility] Rehab folder left with patient  Pt states he's looking forward to getting stronger to get back home

## 2022-03-22 NOTE — PLAN OF CARE
Problem: PAIN - ADULT  Goal: Verbalizes/displays adequate comfort level or baseline comfort level  Description: Interventions:  - Encourage patient to monitor pain and request assistance  - Assess pain using appropriate pain scale  - Administer analgesics based on type and severity of pain and evaluate response  - Implement non-pharmacological measures as appropriate and evaluate response  - Consider cultural and social influences on pain and pain management  - Notify physician/advanced practitioner if interventions unsuccessful or patient reports new pain  Outcome: Progressing     Problem: INFECTION - ADULT  Goal: Absence or prevention of progression during hospitalization  Description: INTERVENTIONS:  - Assess and monitor for signs and symptoms of infection  - Monitor lab/diagnostic results  - Monitor all insertion sites, i e  indwelling lines, tubes, and drains  - Monitor endotracheal if appropriate and nasal secretions for changes in amount and color  - [place] appropriate cooling/warming therapies per order  - Administer medications as ordered  - Instruct and encourage patient and family to use good hand hygiene technique  - Identify and instruct in appropriate isolation precautions for identified infection/condition  Outcome: Progressing  Goal: Absence of fever/infection during neutropenic period  Description: INTERVENTIONS:  - Monitor WBC    Outcome: Progressing     Problem: SAFETY ADULT  Goal: Patient will remain free of falls  Description: INTERVENTIONS:  - Educate patient/family on patient safety including physical limitations  - Instruct patient to call for assistance with activity   - Consult OT/PT to assist with strengthening/mobility   - Keep Call bell within reach  - Keep bed low and locked with side rails adjusted as appropriate  - Keep care items and personal belongings within reach  - Initiate and maintain comfort rounds  - Make Fall Risk Sign visible to staff  - Offer Toileting every Hours, in advance of need  - Initiate/Maintain alarm  - Obtain necessary fall risk management equipment:   - Apply yellow socks and bracelet for high fall risk patients  - Consider moving patient to room near nurses station  Outcome: Progressing  Goal: Maintain or return to baseline ADL function  Description: INTERVENTIONS:  -  Assess patient's ability to carry out ADLs; assess patient's baseline for ADL function and identify physical deficits which impact ability to perform ADLs (bathing, care of mouth/teeth, toileting, grooming, dressing, etc )  - Assess/evaluate cause of self-care deficits   - Assess range of motion  - Assess patient's mobility; develop plan if impaired  - Assess patient's need for assistive devices and provide as appropriate  - Encourage maximum independence but intervene and supervise when necessary  - Involve family in performance of ADLs  - Assess for home care needs following discharge   - Consider OT consult to assist with ADL evaluation and planning for discharge  - Provide patient education as appropriate  Outcome: Progressing  Goal: Maintains/Returns to pre admission functional level  Description: INTERVENTIONS:  - Perform BMAT or MOVE assessment daily    - Set and communicate daily mobility goal to care team and patient/family/caregiver     - Collaborate with rehabilitation services on mobility goals if consulted  - Perform Range of Moty    - Amb  - Ou  - Out of bed f  - Out of bed for toileting  - Record patient progress and toleration of activity level   Outcome: Progressing     Problem: DISCHARGE PLANNING  Goal: Discharge to home or other facility with appropriate resources  Description: INTERVENTIONS:  - Identify barriers to discharge w/patient and caregiver  - Arrange for needed discharge resources and transportation as appropriate  - Identify discharge learning needs (meds, wound care, etc )  - Arrange for interpretive services to assist at discharge as needed  - Refer to Case Management Department for coordinating discharge planning if the patient needs post-hospital services based on physician/advanced practitioner order or complex needs related to functional status, cognitive ability, or social support system  Outcome: Progressing

## 2022-03-22 NOTE — ASSESSMENT & PLAN NOTE
· Acute left Hematoma  · Monitor neuro exam in the left leg as best as possible given concominent injuries  · Monitor DP pulses as well as hemoglobin on routine labs  · Pain control
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
## 2022-03-22 NOTE — TREATMENT PLAN
Individualized Plan of [facility]  Tiburcio Jean 64 y o  male MRN: [id number]  Unit/Bed#: -01 Encounter: [id number]     PATIENT INFORMATION  ADMISSION DATE: 3/22/2022 11:55 AM SUSAN CATEGORY:Orthopedic Disorders:  08 2  Femur (Shaft) Fracture   ADMISSION DIAGNOSIS: Closed fracture of distal end of left femur ([address] ) [S72 402A]  EXPECTED LOS: 10 to 14 days     MEDICAL/FUNCTIONAL PROGNOSIS  Based on my assessment of the patient's medical conditions and current functional status, the prognosis for attaining medical and functional goals or the IRF stay is:  Good    Medical Goals: Patient will be medically stable for discharge to [facility] [place], operated by [facility] upon completion of rehab program and Patient will be able to manage medical conditions and comorbid conditions with medications and follow up upon completion of rehab program    Cardiopulmonary function/Anemia: Ensure cardiopulmonary stability and optimize cardiopulmonary function not only at rest but with activity as patient's activity level significantly increases in acute rehab compared with prior to transfer in preparation for safe discharge from [facility]  Must closely and frequently monitor blood pressure, HR, anemia to ensure adequate cardiac output during ADLs and ambulation as patient is at increased risk for orthostatic hypotension/syncope and potential injury if not monitored for and managed adequately  Blood pressure management:    Frequent monitoring of blood pressure with appropriate adjustments in blood pressure medication management to optimize blood pressure control and prevent/limit renal complications  Monitoring impact of blood pressure and side-effects of blood pressure medications at rest and with activity    Hypoxia prevention: Ensure appropriate level of oxygenation at rest and with activity to avoid symptomatic hypoxia, maximize functional performance, and decrease risk of atelectasis/pneumonia through close and frequent monitoring, providing appropriate respiratory treatments (such as incentive spirometry), and when necessary provide/adjust respiratory medications  Pain management:  Pain will improve with frequent evaluation of pain, careful adjustments in medications, frequent re-evaluation of patient's pain and medical/neurologic status to ensure optimal pain control, avoidance of potential serious and even life-threatening side-effects and drug interactions, as well as weaning pain medications as soon as possible to decrease risk of short and long-term use  Orthopedic Disorder: Multiple rib fractures, left femur fracture causing impaired mobility, ADLs, and gait:  intensive skilled therapies with physical therapy and occupational therapy with close oversight and management by rehab specialized physician in acute rehabilitation setting to most expeditiously and effectively improve functional mobility, transfers, upper and lower body strengthening, conditioning, balance, and gait training with appropriate assistive device  Patient will have optimal supervision and management of patient's underlying orthopedic disorder with specialized rehabilitation physician during this period of recovery to ensure most expeditious and optimal recovery with decreased risks of fall/injury and other complications including acute worsening of ortho disorder, decrease risk of VTE, PNA, and skin ulceration  Inpatient rehabilitation education/teaching: To be provided to patient and typically family/caregiver (if able to be identified) by all skilled therapists, rehab nursing, case management, and rehab specialized physician to ensure optimal recovery and decrease risks of complications in both acute rehabilitation setting as well as after discharge     Bladder dysfunction:  Appropriate bladder management with appropriate toileting program from rehab nursing and staff with oversight management by rehabilitation physicians which include appropriate monitoring and possible adjustments in medications to with goals to optimize bladder function and decrease risk of bladder retention, incontinence, and urinary tract infection  Bowel dysfunction: Appropriate bowel management with appropriate toileting program from rehab nursing and staff with oversight management by rehabilitation physicians which include adjustments in medications to optimize appropriate bowel function and prevent/decrease risk of constipation and bowel obstruction  Obesity:  Close monitoring of nutrition status, nutrition specialist with adjustments in diet   on appropriate short and long term nutrition and activity  Obesity increases complexity of patient's overall condition and causes unique challenges during this part of patient's recovery process  Supervise and if necessary make adjustments in rehab nursing care and skilled therapy care to ensure appropriate toileting, bed mobility, other ADLs, and ambulation to decrease risk of falls/injuries, VTE, skin breakdown/ulceration and optimize functional recovery  Skin wounds: Appropriate skin checks for wound/skin evaluation including evaluation of healing, worsening of wounds, or signs of infection  Wound care management from rehab nursing, wound care nursing, physicians  Ensure frequent appropriate turning, positioning in bed, in chair, when mobilizing, and when appropriate with use of appropriate devices to optimize healing and decrease risk of worsening or new skin breakdown          ANTICIPATED DISCHARGE DISPOSITION AND SERVICES  COMMUNITY SETTING: Home - Assistance    ANTICIPATED FOLLOW-UP SERVICE:   Home Health Services: PT, OT and Nursing    DISCIPLINE SPECIFIC PLANS:  Required Disciplines & Services: Rehabillitation Nursing, Case Management and Dietay/Nutrition    REQUIRED THERAPY:  Therapy Hours per Day Days per Week Total Days   Physical Therapy 1 5 5 14   Occupational Therapy 1 5 5 14   NOTE: Additional therapy time(s) or changes to allocation of therapies as appropriate to meet patient needs and to achieve functional goals  REQUIRED THERAPY:   900 min/7 days a week  NOTE: Additional therapy time(s) or changes to allocation of therapies as appropriate to meet patient needs and to achieve functional goals      Patient will participate in above therapy regimen consisting of PT and OT due to the following medical procedure/condition:Orthopedic Disorders:  08 2  Femur (Shaft) Fracture    ANTICIPATED FUNCTIONAL OUTCOMES:  ADL:  Modified Independent-Min A   Bladder/Bowel:   Modified Independent-Min A   Transfers:   Modified Independent   Locomotion:   Modified Independent-Min A   Cognitive:  Supervision-independent     DISCHARGE PLANNING NEEDS  Equipment needs: Discharge needs to be reviewed with team      REHAB ANTICIPATED PARTICIPATION RESTRICTIONS:  Amubulate Short Distances Only, Assist with Bathing in Tub, Assist with Mobility, Rquires Assist with ADLS, Requires Assit with Homemaking, Requires Assit with Steps and Weight Bearing Non-weight bearing to the left leg in Knee immobilizer    Haley Dunne,   Physical Medicine and [facility]
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
## 2022-03-22 NOTE — ASSESSMENT & PLAN NOTE
· Colace, senna, Miralax standing- refusing senna and colace, but will take miralax  · Bisacodyl suppository and lactulose daily PRN  · Consider relistor- refused  · Mag citrate 1/2 bottle ordered on admission- refused  · Small BM 3/27, first since prior to admission   Education provided on risks of constipation, Ileus
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
## 2022-03-22 NOTE — ASSESSMENT & PLAN NOTE
· History of chronic low back pain s/p long history of injections and multiple prior spinal surgeries  · On Morphine ER 100mg Q12H and percocet 10/325 Q6H as an outpatient  · Patient was offered nerve block but declined  · Can increase gabapentin if needed  · Can consider cymbalta or Elavil  · Adding diclofenac gel  · Refusing bowel regimen- High risk for ileus and obstruction   Would consider increasing regimen when has BM  · Threatening at times to leave AMA, behavior can change throughout day
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
## 2022-03-22 NOTE — ASSESSMENT & PLAN NOTE
· Acute left hematoma  · Monitor neurovascular exam  · Follow routine blood work for hgb  · Pain control  · PT/OT

## 2022-03-22 NOTE — ASSESSMENT & PLAN NOTE
· Multiple rib fractures bilateral  · Minimally displaced right 3rd rib  · Nondisplaced rib fractures 4-9 on the right  · Left non-displaced rib fractures 3,5, and 8  · Rib fracture protocol  · Promote IS, cough  · Pain control Alert and oriented, no focal deficits, no motor or sensory deficits.
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
## 2022-03-22 NOTE — PLAN OF CARE
Problem: PAIN - ADULT  Goal: Verbalizes/displays adequate comfort level or baseline comfort level  Description: Interventions:  - Encourage patient to monitor pain and request assistance  - Assess pain using appropriate pain scale  - Administer analgesics based on type and severity of pain and evaluate response  - Implement non-pharmacological measures as appropriate and evaluate response  - Consider cultural and social influences on pain and pain management  - Notify physician/advanced practitioner if interventions unsuccessful or patient reports new pain  Outcome: Progressing     Problem: INFECTION - ADULT  Goal: Absence or prevention of progression during hospitalization  Description: INTERVENTIONS:  - Assess and monitor for signs and symptoms of infection  - Monitor lab/diagnostic results  - Monitor all insertion sites, i e  indwelling lines, tubes, and drains  - Monitor endotracheal if appropriate and nasal secretions for changes in amount and color  - [place] appropriate cooling/warming therapies per order  - Administer medications as ordered  - Instruct and encourage patient and family to use good hand hygiene technique  - Identify and instruct in appropriate isolation precautions for identified infection/condition  Outcome: Progressing  Goal: Absence of fever/infection during neutropenic period  Description: INTERVENTIONS:  - Monitor WBC    Outcome: Progressing     Problem: SAFETY ADULT  Goal: Patient will remain free of falls  Description: INTERVENTIONS:  - Educate patient/family on patient safety including physical limitations  - Instruct patient to call for assistance with activity   - Consult OT/PT to assist with strengthening/mobility   - Keep Call bell within reach  - Keep bed low and locked with side rails adjusted as appropriate  - Keep care items and personal belongings within reach  - Initiate and maintain comfort rounds  - Make Fall Risk Sign visible to staff  - Offer Toileting rs, in advance of need  - Initiate/Ma  - Obtain necessary fall risk management equipm  - Apply yellow socks and bracelet for high fall risk patients  - Consider moving patient to room near nurses station  Outcome: Progressing  Goal: Maintain or return to baseline ADL function  Description: INTERVENTIONS:  -  Assess patient's ability to carry out ADLs; assess patient's baseline for ADL function and identify physical deficits which impact ability to perform ADLs (bathing, care of mouth/teeth, toileting, grooming, dressing, etc )  - Assess/evaluate cause of self-care deficits   - Assess range of motion  - Assess patient's mobility; develop plan if impaired  - Assess patient's need for assistive devices and provide as appropriate  - Encourage maximum independence but intervene and supervise when necessary  - Involve family in performance of ADLs  - Assess for home care needs following discharge   - Consider OT consult to assist with ADL evaluation and planning for discharge  - Provide patient education as appropriate  Outcome: Progressing  Goal: Maintains/Returns to pre admission functional level  Description: INTERVENTIONS:  - Perform BMAT or MOVE assessment daily    - Set and communicate daily mobility goal to care team and patient/family/caregiver     - Collaborate with rehabilitation services on mobility goals if consulted  - Perform Rang  - Out of bed for toileting  - Record patient progress and toleration of activity level   Outcome: Progressing     Problem: DISCHARGE PLANNING  Goal: Discharge to home or other facility with appropriate resources  Description: INTERVENTIONS:  - Identify barriers to discharge w/patient and caregiver  - Arrange for needed discharge resources and transportation as appropriate  - Identify discharge learning needs (meds, wound care, etc )  - Arrange for interpretive services to assist at discharge as needed  - Refer to Case Management Department for coordinating discharge planning if the patient needs post-hospital services based on physician/advanced practitioner order or complex needs related to functional status, cognitive ability, or social support system  Outcome: Progressing

## 2022-03-22 NOTE — ASSESSMENT & PLAN NOTE
· Chronic appearing avulsion fractures at the medial and lateral malleolus  · Significant edema and ecchymosis suspect ligamentous/tendinous injury as well  · Diclofenac gel TID and ace wrap for pain control, outpatient follow up
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
## 2022-03-23 ENCOUNTER — TELEPHONE (OUTPATIENT)
Dept: OBGYN CLINIC | Facility: HOSPITAL | Age: 62
End: 2022-03-23

## 2022-03-23 PROCEDURE — 97535 SELF CARE MNGMENT TRAINING: CPT

## 2022-03-23 PROCEDURE — 99232 SBSQ HOSP IP/OBS MODERATE 35: CPT | Performed by: INTERNAL MEDICINE

## 2022-03-23 PROCEDURE — 0HBRXZZ EXCISION OF TOE NAIL, EXTERNAL APPROACH: ICD-10-PCS | Performed by: PODIATRIST

## 2022-03-23 PROCEDURE — 97530 THERAPEUTIC ACTIVITIES: CPT

## 2022-03-23 PROCEDURE — 97167 OT EVAL HIGH COMPLEX 60 MIN: CPT

## 2022-03-23 PROCEDURE — 97110 THERAPEUTIC EXERCISES: CPT

## 2022-03-23 PROCEDURE — 99233 SBSQ HOSP IP/OBS HIGH 50: CPT | Performed by: STUDENT IN AN ORGANIZED HEALTH CARE EDUCATION/TRAINING PROGRAM

## 2022-03-23 PROCEDURE — 97162 PT EVAL MOD COMPLEX 30 MIN: CPT

## 2022-03-23 RX ORDER — OXYCODONE HYDROCHLORIDE 10 MG/1
10 TABLET ORAL ONCE
Status: DISCONTINUED | OUTPATIENT
Start: 2022-03-23 | End: 2022-03-29 | Stop reason: HOSPADM

## 2022-03-23 RX ORDER — DOCUSATE SODIUM 100 MG/1
100 CAPSULE, LIQUID FILLED ORAL ONCE
Status: DISCONTINUED | OUTPATIENT
Start: 2022-03-23 | End: 2022-03-29 | Stop reason: HOSPADM

## 2022-03-23 RX ORDER — SENNOSIDES 8.6 MG
1 TABLET ORAL
Status: DISCONTINUED | OUTPATIENT
Start: 2022-03-23 | End: 2022-03-29 | Stop reason: HOSPADM

## 2022-03-23 RX ORDER — POLYETHYLENE GLYCOL 3350 17 G/17G
17 POWDER, FOR SOLUTION ORAL ONCE
Status: DISCONTINUED | OUTPATIENT
Start: 2022-03-23 | End: 2022-03-29 | Stop reason: HOSPADM

## 2022-03-23 RX ORDER — BISACODYL 10 MG
10 SUPPOSITORY, RECTAL RECTAL DAILY PRN
Status: DISCONTINUED | OUTPATIENT
Start: 2022-03-23 | End: 2022-03-29 | Stop reason: HOSPADM

## 2022-03-23 RX ADMIN — ENOXAPARIN SODIUM 30 MG: 30 INJECTION SUBCUTANEOUS at 09:04

## 2022-03-23 RX ADMIN — OXYCODONE HYDROCHLORIDE 15 MG: 10 TABLET ORAL at 14:43

## 2022-03-23 RX ADMIN — OXYCODONE HYDROCHLORIDE 15 MG: 10 TABLET ORAL at 05:46

## 2022-03-23 RX ADMIN — ACETAMINOPHEN 975 MG: 325 TABLET ORAL at 14:06

## 2022-03-23 RX ADMIN — OXYCODONE HYDROCHLORIDE 15 MG: 10 TABLET ORAL at 10:37

## 2022-03-23 RX ADMIN — MORPHINE SULFATE 105 MG: 15 TABLET, FILM COATED, EXTENDED RELEASE ORAL at 09:03

## 2022-03-23 RX ADMIN — PANTOPRAZOLE SODIUM 40 MG: 40 TABLET, DELAYED RELEASE ORAL at 05:43

## 2022-03-23 RX ADMIN — LEVOTHYROXINE SODIUM 150 MCG: 75 TABLET ORAL at 05:43

## 2022-03-23 RX ADMIN — METHOCARBAMOL TABLETS 750 MG: 750 TABLET, COATED ORAL at 00:34

## 2022-03-23 RX ADMIN — MORPHINE SULFATE 105 MG: 15 TABLET, FILM COATED, EXTENDED RELEASE ORAL at 21:39

## 2022-03-23 RX ADMIN — METHOCARBAMOL TABLETS 750 MG: 750 TABLET, COATED ORAL at 17:45

## 2022-03-23 RX ADMIN — LORATADINE AND PSEUDOEPHEDRINE 1 TABLET: 10; 240 TABLET, EXTENDED RELEASE ORAL at 14:06

## 2022-03-23 RX ADMIN — NICOTINE 21 MG: 21 PATCH, EXTENDED RELEASE TRANSDERMAL at 21:39

## 2022-03-23 RX ADMIN — METHOCARBAMOL TABLETS 750 MG: 750 TABLET, COATED ORAL at 11:34

## 2022-03-23 RX ADMIN — DICLOFENAC SODIUM 2 G: 10 GEL TOPICAL at 16:09

## 2022-03-23 RX ADMIN — METHOCARBAMOL TABLETS 750 MG: 750 TABLET, COATED ORAL at 23:22

## 2022-03-23 RX ADMIN — METHOCARBAMOL TABLETS 750 MG: 750 TABLET, COATED ORAL at 05:43

## 2022-03-23 NOTE — TELEPHONE ENCOUNTER
Patient had ORIF of the L femur with Dr Parish Stroud on 3/16  He will need a PO appointment or consult if still on the [facility] on 3/30
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
## 2022-03-23 NOTE — PLAN OF CARE
Problem: PAIN - ADULT  Goal: Verbalizes/displays adequate comfort level or baseline comfort level  Description: Interventions:  - Encourage patient to monitor pain and request assistance  - Assess pain using appropriate pain scale  - Administer analgesics based on type and severity of pain and evaluate response  - Implement non-pharmacological measures as appropriate and evaluate response  - Consider cultural and social influences on pain and pain management  - Notify physician/advanced practitioner if interventions unsuccessful or patient reports new pain  Outcome: Progressing     Problem: INFECTION - ADULT  Goal: Absence or prevention of progression during hospitalization  Description: INTERVENTIONS:  - Assess and monitor for signs and symptoms of infection  - Monitor lab/diagnostic results  - Monitor all insertion sites, i e  indwelling lines, tubes, and drains  - Monitor endotracheal if appropriate and nasal secretions for changes in amount and color  - [place] appropriate cooling/warming therapies per order  - Administer medications as ordered  - Instruct and encourage patient and family to use good hand hygiene technique  - Identify and instruct in appropriate isolation precautions for identified infection/condition  Outcome: Progressing  Goal: Absence of fever/infection during neutropenic period  Description: INTERVENTIONS:  - Monitor WBC    Outcome: Progressing     Problem: SAFETY ADULT  Goal: Patient will remain free of falls  Description: INTERVENTIONS:  - Educate patient/family on patient safety including physical limitations  - Instruct patient to call for assistance with activity   - Consult OT/PT to assist with strengthening/mobility   - Keep Call bell within reach  - Keep bed low and locked with side rails adjusted as appropriate  - Keep care items and personal belongings within reach  - Initiate and maintain comfort rounds  - Make Fall Risk Sign visible to staff  - Offer Toileting every Hours, in advance of need  - Initiate/Maintainalarm  - Obtain necessary fall risk management equipment:   - Apply yellow socks and bracelet for high fall risk patients  - Consider moving patient to room near nurses station  Outcome: Progressing  Goal: Maintain or return to baseline ADL function  Description: INTERVENTIONS:  -  Assess patient's ability to carry out ADLs; assess patient's baseline for ADL function and identify physical deficits which impact ability to perform ADLs (bathing, care of mouth/teeth, toileting, grooming, dressing, etc )  - Assess/evaluate cause of self-care deficits   - Assess range of motion  - Assess patient's mobility; develop plan if impaired  - Assess patient's need for assistive devices and provide as appropriate  - Encourage maximum independence but intervene and supervise when necessary  - Involve family in performance of ADLs  - Assess for home care needs following discharge   - Consider OT consult to assist with ADL evaluation and planning for discharge  - Provide patient education as appropriate  Outcome: Progressing  Goal: Maintains/Returns to pre admission functional level  Description: INTERVENTIONS:  - Perform BMAT or MOVE assessment daily    - Set and communicate daily mobility goal to care team and patient/family/caregiver  - Collaborate with rehabilitation services on mobility goals if consulted  - Perform Range ofs a day    - Reposition  - Dangle p  - Stand patie  - Ambulate   - Out of bed to c  - Out of bed for   - Out of bed for toileting  - Record patient progress and toleration of activity level   Outcome: Progressing     Problem: DISCHARGE PLANNING  Goal: Discharge to home or other facility with appropriate resources  Description: INTERVENTIONS:  - Identify barriers to discharge w/patient and caregiver  - Arrange for needed discharge resources and transportation as appropriate  - Identify discharge learning needs (meds, wound care, etc )  - Arrange for interpretive services to assist at discharge as needed  - Refer to Case Management Department for coordinating discharge planning if the patient needs post-hospital services based on physician/advanced practitioner order or complex needs related to functional status, cognitive ability, or social support system  Outcome: Progressing     Problem: Prexisting or High Potential for Compromised Skin Integrity  Goal: Skin integrity is maintained or improved  Description: INTERVENTIONS:  - Identify patients at risk for skin breakdown  - Assess and monitor skin integrity  - Assess and monitor nutrition and hydration status  - Monitor labs   - Assess for incontinence   - Turn and reposition patient  - Assist with mobility/ambulation  - Relieve pressure over bony prominences  - Avoid friction and shearing  - Provide appropriate hygiene as needed including keeping skin clean and dry  - Evaluate need for skin moisturizer/barrier cream  - Collaborate with interdisciplinary team   - Patient/family teaching  - Consider wound care consult   Outcome: Progressing     Problem: Potential for Falls  Goal: Patient will remain free of falls  Description: INTERVENTIONS:  - Educate patient/family on patient safety including physical limitations  - Instruct patient to call for assistance with activity   - Consult OT/PT to assist with strengthening/mobility   - Keep Call bell within reach  - Keep bed low and locked with side rails adjusted as appropriate  - Keep care items and personal belongings within reach  - Initiate and maintain comfort rounds  - Make Fall Risk Sign visible to staff  - Offer Toileting every  Hours, in advance of need  - Initiate/Maintain alarm  - Obtain necessary fall risk management equipment:   - Apply yellow socks and bracelet for high fall risk patients  - Consider moving patient to room near nurses station  Outcome: Progressing
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
## 2022-03-23 NOTE — PROGRESS NOTES
OT LT/23/22 0830   Rehab Team Goals   ADL Team Goal Patient will be independent with ADLs with least restrictive device upon completion of rehab program   Rehab Team Interventions   OT Interventions Self Care;Home Management; Therapeutic Exercise; Energy Conservation;Splint Fabrication/Modification;Patient/Family Education   Eating Goal   Eating Goal 06  Independent - Patient completes the activity by him/herself with no assistance from a helper  Status Ongoing; Target goal - two weeks   Interventions Optimal Position   Grooming Goal   Oral Hygiene Goal 06  Independent - Patient completes the activity by him/herself with no assistance from a helper  Task Wash/Dry Face;Wash/Dry Hands;Brush Teeth;Comb Hair   Environment Seated at [employer]   Status Ongoing; Target goal - two weeks   Intervention Balance Work;Assistive Device; Therapeutic Exercise; Tolerance Work   Tub/Shower Transfer Goal   Method   (to add as functionally appropriate)   Bathing Goal   Shower/bathe self Goal 03  Partial/moderate assistance - [place] does less than half the effort  [place] lifts or holds trunk or limbs and provides more than half the effort  Environment Seated; In Bed   Safety Precautions NWB  (L LE)   Status Ongoing; Target goal - two weeks   Intervention ADL Training;Assistive Device; Therapeutic Exercise   Upper Body Dressing Goal   Upper body dressing Goal 05  Setup or clean-up assistance - [place] SETS UP or CLEANS UP, patient completes activity  [place] assists only prior to or following the activity  Task Upper Body   Environment Seated   Status Ongoing; Target goal - two weeks   Intervention Assistive Device;Balance Work; Therapeutic Exercise; Tolerance Work   Lower Body Dressing Goal   Lower body dressing Goal 03  Partial/moderate assistance - [place] does less than half the effort  [place] lifts or holds trunk or limbs and provides more than half the effort  Putting on/taking off footwear Goal 03   Partial/moderate assistance - [place] does less than half the effort  [place] lifts or holds trunk or limbs and provides more than half the effort  Task Lower Body;Socks;Shoe/Slipper   Adaptive Equipment Sock Aide;Reacher   Environment Seated;Supine   Safety Precautions NWB  (L LE)   Status Ongoing; Target goal - two weeks   Intervention Assistive Device;Balance Work; Therapeutic Exercise; Tolerance Work   Toileting Transfer Goal   Toilet transfer Goal 06  Independent - Patient completes the activity by him/herself with no assistance from a helper  Assistive Device Drop Arm Commode   Safety NWB  (L LE)   Status Ongoing; Target goal - two weeks   Intervention ADL Training;Balance Work;Assistive Device   Toileting Goal   Toileting hygiene Goal 05  Setup or clean-up assistance - [place] SETS UP or CLEANS UP, patient completes activity  [place] assists only prior to or following the activity  Task Pants Up;Hygiene;Pants Down   Status Ongoing; Target goal - two weeks   Intervention ADL Training;Balance Work;Assistive Device   Meal Prep and Kitchen Mobility   Assist Level Independent   Status Ongoing; Target goal - two weeks  (w/c level)

## 2022-03-24 LAB
ANION GAP SERPL CALCULATED.3IONS-SCNC: 6 MMOL/L (ref 4–13)
BASOPHILS # BLD AUTO: 0.07 THOUSANDS/ΜL (ref 0–0.1)
BASOPHILS NFR BLD AUTO: 1 % (ref 0–1)
BUN SERPL-MCNC: 14 MG/DL (ref 5–25)
CALCIUM SERPL-MCNC: 8.7 MG/DL (ref 8.3–10.1)
CHLORIDE SERPL-SCNC: 106 MMOL/L (ref 100–108)
CO2 SERPL-SCNC: 27 MMOL/L (ref 21–32)
CREAT SERPL-MCNC: 0.68 MG/DL (ref 0.6–1.3)
EOSINOPHIL # BLD AUTO: 0.18 THOUSAND/ΜL (ref 0–0.61)
EOSINOPHIL NFR BLD AUTO: 2 % (ref 0–6)
ERYTHROCYTE [DISTWIDTH] IN BLOOD BY AUTOMATED COUNT: 16.4 % (ref 11.6–15.1)
GFR SERPL CREATININE-BSD FRML MDRD: 103 ML/MIN/1.73SQ M
GLUCOSE P FAST SERPL-MCNC: 115 MG/DL (ref 65–99)
GLUCOSE SERPL-MCNC: 115 MG/DL (ref 65–140)
HCT VFR BLD AUTO: 27.8 % (ref 36.5–49.3)
HGB BLD-MCNC: 9 G/DL (ref 12–17)
IMM GRANULOCYTES # BLD AUTO: 0.29 THOUSAND/UL (ref 0–0.2)
IMM GRANULOCYTES NFR BLD AUTO: 2 % (ref 0–2)
LYMPHOCYTES # BLD AUTO: 2.35 THOUSANDS/ΜL (ref 0.6–4.47)
LYMPHOCYTES NFR BLD AUTO: 20 % (ref 14–44)
MCH RBC QN AUTO: 30.7 PG (ref 26.8–34.3)
MCHC RBC AUTO-ENTMCNC: 32.4 G/DL (ref 31.4–37.4)
MCV RBC AUTO: 95 FL (ref 82–98)
MONOCYTES # BLD AUTO: 1.05 THOUSAND/ΜL (ref 0.17–1.22)
MONOCYTES NFR BLD AUTO: 9 % (ref 4–12)
NEUTROPHILS # BLD AUTO: 8.04 THOUSANDS/ΜL (ref 1.85–7.62)
NEUTS SEG NFR BLD AUTO: 66 % (ref 43–75)
NRBC BLD AUTO-RTO: 0 /100 WBCS
PLATELET # BLD AUTO: 331 THOUSANDS/UL (ref 149–390)
PMV BLD AUTO: 9 FL (ref 8.9–12.7)
POTASSIUM SERPL-SCNC: 3.5 MMOL/L (ref 3.5–5.3)
RBC # BLD AUTO: 2.93 MILLION/UL (ref 3.88–5.62)
SODIUM SERPL-SCNC: 139 MMOL/L (ref 136–145)
WBC # BLD AUTO: 11.98 THOUSAND/UL (ref 4.31–10.16)

## 2022-03-24 PROCEDURE — 99233 SBSQ HOSP IP/OBS HIGH 50: CPT | Performed by: STUDENT IN AN ORGANIZED HEALTH CARE EDUCATION/TRAINING PROGRAM

## 2022-03-24 PROCEDURE — 99232 SBSQ HOSP IP/OBS MODERATE 35: CPT | Performed by: INTERNAL MEDICINE

## 2022-03-24 PROCEDURE — 80048 BASIC METABOLIC PNL TOTAL CA: CPT | Performed by: NURSE PRACTITIONER

## 2022-03-24 PROCEDURE — 97530 THERAPEUTIC ACTIVITIES: CPT

## 2022-03-24 PROCEDURE — 85025 COMPLETE CBC W/AUTO DIFF WBC: CPT | Performed by: NURSE PRACTITIONER

## 2022-03-24 PROCEDURE — 97535 SELF CARE MNGMENT TRAINING: CPT

## 2022-03-24 RX ADMIN — POLYETHYLENE GLYCOL 3350 17 G: 17 POWDER, FOR SOLUTION ORAL at 09:33

## 2022-03-24 RX ADMIN — OXYCODONE HYDROCHLORIDE 15 MG: 10 TABLET ORAL at 11:53

## 2022-03-24 RX ADMIN — LORATADINE AND PSEUDOEPHEDRINE 1 TABLET: 10; 240 TABLET, EXTENDED RELEASE ORAL at 09:28

## 2022-03-24 RX ADMIN — PANTOPRAZOLE SODIUM 40 MG: 40 TABLET, DELAYED RELEASE ORAL at 05:42

## 2022-03-24 RX ADMIN — METHOCARBAMOL TABLETS 750 MG: 750 TABLET, COATED ORAL at 11:50

## 2022-03-24 RX ADMIN — STANDARDIZED SENNA CONCENTRATE 8.6 MG: 8.6 TABLET ORAL at 20:58

## 2022-03-24 RX ADMIN — METHOCARBAMOL TABLETS 750 MG: 750 TABLET, COATED ORAL at 05:42

## 2022-03-24 RX ADMIN — DICLOFENAC SODIUM 2 G: 10 GEL TOPICAL at 20:59

## 2022-03-24 RX ADMIN — LEVOTHYROXINE SODIUM 150 MCG: 75 TABLET ORAL at 05:42

## 2022-03-24 RX ADMIN — ENOXAPARIN SODIUM 30 MG: 30 INJECTION SUBCUTANEOUS at 20:58

## 2022-03-24 RX ADMIN — NICOTINE 21 MG: 21 PATCH, EXTENDED RELEASE TRANSDERMAL at 20:57

## 2022-03-24 RX ADMIN — OXYCODONE HYDROCHLORIDE 15 MG: 10 TABLET ORAL at 16:00

## 2022-03-24 RX ADMIN — MORPHINE SULFATE 105 MG: 15 TABLET, FILM COATED, EXTENDED RELEASE ORAL at 20:57

## 2022-03-24 RX ADMIN — OXYCODONE HYDROCHLORIDE 15 MG: 10 TABLET ORAL at 05:46

## 2022-03-24 RX ADMIN — MORPHINE SULFATE 105 MG: 15 TABLET, FILM COATED, EXTENDED RELEASE ORAL at 09:26

## 2022-03-24 RX ADMIN — METHOCARBAMOL TABLETS 750 MG: 750 TABLET, COATED ORAL at 17:34
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
## 2022-03-24 NOTE — PROGRESS NOTES
03/24/22 1330   Pain Assessment   Pain Assessment Tool 0-10   Pain Score 10 - Worst Possible Pain   Pain Location/Orientation Orientation: Left; Location: Leg   Restrictions/Precautions   Precautions Bed/chair alarms;Cognitive; Fall Risk;Supervision on toilet/commode   LLE Weight Bearing Per Order NWB   Braces or Orthoses LE Immobilizer  (L LE )   Subjective   Subjective Pt c/o left leg having 10/10 pain refusing PT d/t pain lvl    Roll Left and Right   Type of Assistance Needed Physical assistance   Physical Assistance Level 26%-50%   Comment pt uses bed rails to roll L &  R, need A to keep L LE straight   Roll Left and Right CARE Score 3   Sit to Lying   Type of Assistance Needed Physical assistance   Physical Assistance Level 26%-50%   Comment assist with LLE management    Sit to Lying CARE Score 3   Assessment   Treatment Assessment pt c/o 10/10 pain left leg with brace and pt also repositioning in bed with left leg comfortable , few activities and instructed /ed   pt on getting OOB and inc functional mobility taking pain meds on time and getting more mobilie to inc BM and having BM and getting more mobilie to dec pain lvl    Barriers to Discharge Decreased caregiver support   Plan   Progress Slow progress, decreased activity tolerance   PT Therapy Minutes   PT Time In 1330   PT Time Out 1400   PT Total Time (minutes) 30   PT Mode of treatment - Individual (minutes) 30   PT Mode of treatment - Concurrent (minutes) 0   PT Mode of treatment - Group (minutes) 0   PT Mode of treatment - Co-treat (minutes) 0   PT Mode of Treatment - Total time(minutes) 30 minutes   PT Cumulative Minutes 120   Therapy Time missed   Time missed?  Yes   Amount of time missed 60   Reason for time missed Illness  (pain 10/10 left leg )   Time(s) multiple attempts made x3
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
## 2022-03-24 NOTE — CASE MANAGEMENT
Met w/pt and reviewed rehab routine and cm role  Pt resides with his adult son and dtr and a dtr that is 16  His son works, his 21 yr old dtr is on a break from college and looking for work  Per pt she will be the point of contact for family training and dc arrangements  His 16 yr old is in high school  Pt has a two story home with a bed and 1/2 bath on the first flr  Pt states he has a walker, shower seat and cane and is aware of the need for a w/c and possibly a commode  Pt is requesting a hospital bed to rent for a few months on dc as well as a van ride home  [facility]  Reviewed team mtg process and potential  [place], pt is stating he thinks he is going home next week  Following to assist w/dc planning needs

## 2022-03-24 NOTE — PLAN OF CARE
Problem: PAIN - ADULT  Goal: Verbalizes/displays adequate comfort level or baseline comfort level  Description: Interventions:  - Encourage patient to monitor pain and request assistance  - Assess pain using appropriate pain scale  - Administer analgesics based on type and severity of pain and evaluate response  - Implement non-pharmacological measures as appropriate and evaluate response  - Consider cultural and social influences on pain and pain management  - Notify physician/advanced practitioner if interventions unsuccessful or patient reports new pain  Outcome: Progressing     Problem: INFECTION - ADULT  Goal: Absence or prevention of progression during hospitalization  Description: INTERVENTIONS:  - Assess and monitor for signs and symptoms of infection  - Monitor lab/diagnostic results  - Monitor all insertion sites, i e  indwelling lines, tubes, and drains  - Monitor endotracheal if appropriate and nasal secretions for changes in amount and color  - [place] appropriate cooling/warming therapies per order  - Administer medications as ordered  - Instruct and encourage patient and family to use good hand hygiene technique  - Identify and instruct in appropriate isolation precautions for identified infection/condition  Outcome: Progressing  Goal: Absence of fever/infection during neutropenic period  Description: INTERVENTIONS:  - Monitor WBC    Outcome: Progressing    Goal: Maintain or return to baseline ADL function  Description: INTERVENTIONS:  -  Assess patient's ability to carry out ADLs; assess patient's baseline for ADL function and identify physical deficits which impact ability to perform ADLs (bathing, care of mouth/teeth, toileting, grooming, dressing, etc )  - Assess/evaluate cause of self-care deficits   - Assess range of motion  - Assess patient's mobility; develop plan if impaired  - Assess patient's need for assistive devices and provide as appropriate  - Encourage maximum independence but intervene and supervise when necessary  - Involve family in performance of ADLs  - Assess for home care needs following discharge   - Consider OT consult to assist with ADL evaluation and planning for discharge  - Provide patient education as appropriate  Outcome: Progressing  Goal: Maintains/Returns to pre admission functional level  Description: INTERVENTIONS:  - Perform BMAT or MOVE assessment daily    - Set and communicate daily mobility goal to care team and patient/family/caregiver  - Collaborate with rehabilitation services on mobility goals if consulted  - Perform Range of Motion 3 times a day  - Reposition patient every 2 hours    - Dangle patient 3 times a day  - Stand patient 3 times a day  - Ambulate patient 3 times a day  - Out of bed to chair 3 times a day   - Out of bed for meals 3 times a day  - Out of bed for toileting  - Record patient progress and toleration of activity level   Outcome: Progressing     Problem: DISCHARGE PLANNING  Goal: Discharge to home or other facility with appropriate resources  Description: INTERVENTIONS:  - Identify barriers to discharge w/patient and caregiver  - Arrange for needed discharge resources and transportation as appropriate  - Identify discharge learning needs (meds, wound care, etc )  - Arrange for interpretive services to assist at discharge as needed  - Refer to Case Management Department for coordinating discharge planning if the patient needs post-hospital services based on physician/advanced practitioner order or complex needs related to functional status, cognitive ability, or social support system  Outcome: Progressing     Problem: Prexisting or High Potential for Compromised Skin Integrity  Goal: Skin integrity is maintained or improved  Description: INTERVENTIONS:  - Identify patients at risk for skin breakdown  - Assess and monitor skin integrity  - Assess and monitor nutrition and hydration status  - Monitor labs   - Assess for incontinence   - Turn and reposition patient  - Assist with mobility/ambulation  - Relieve pressure over bony prominences  - Avoid friction and shearing  - Provide appropriate hygiene as needed including keeping skin clean and dry  - Evaluate need for skin moisturizer/barrier cream  - Collaborate with interdisciplinary team   - Patient/family teaching  - Consider wound care consult   Outcome: Progressing     Problem: Potential for Falls  Goal: Patient will remain free of falls  Description: INTERVENTIONS:  - Educate patient/family on patient safety including physical limitations  - Instruct patient to call for assistance with activity   - Consult OT/PT to assist with strengthening/mobility   - Keep Call bell within reach  - Keep bed low and locked with side rails adjusted as appropriate  - Keep care items and personal belongings within reach  - Initiate and maintain comfort rounds  - Make Fall Risk Sign visible to staff  - Offer Toileting every 2 Hours, in advance of need  - Apply yellow socks and bracelet for high fall risk patients  - Consider moving patient to room near nurses station  Outcome: Progressing

## 2022-03-24 NOTE — PROGRESS NOTES
Occupational Therapy Treatment Note         03/24/22 1000   Pain Assessment   Pain Assessment Tool 0-10   Pain Score 6   Pain Location/Orientation Orientation: Left; Location: Leg   Hospital Pain Intervention(s) Repositioned   Restrictions/Precautions   Precautions Bed/chair alarms;Cognitive; Fall Risk;Supervision on toilet/commode   LLE Weight Bearing Per Order NWB   Braces or Orthoses LE Immobilizer  (L LE )   Lifestyle   Autonomy "I understand, I will stay here for 2 weeks"    Sit to Stand   Reason if not Attempted Safety concerns   Sit to Stand CARE Score 88   Bed-Chair Transfer   Type of Assistance Needed Physical assistance; Adaptive equipment   Physical Assistance Level Total assistance   Comment Sliding board transfer to/from bed and w/c, min assist x1 in back, supervision of 2nd in front to ensure pt maintaining L LE NWB, which pt did  Anticipate could trial assist x1 sliding board transfers next session, pending pt's pain control  Chair/Bed-to-Chair Transfer CARE Score 1   Toileting Hygiene   Type of Assistance Needed Incidental touching   Physical Assistance Level No physical assistance   Comment Seated on BSC, pt able to weight shifting with guarding assist for clothing management of loose shorts up and down  Toileting Hygiene CARE Score 4   Toilet Transfer   Type of Assistance Needed Physical assistance; Adaptive equipment   Physical Assistance Level Total assistance   Comment Drop arm wide BSC to/from bed, min assist x1 sliding board transfer in back with 2nd person supervising in front to ensure L LE NWB      Toilet Transfer CARE Score 1   Cognition   Overall Cognitive Status Impaired   Arousal/Participation Alert   Attention Attends with cues to redirect   Orientation Level Oriented X4   Memory Decreased short term memory   Following Commands Follows multistep commands with increased time or repetition   Activity Tolerance   Activity Tolerance Patient tolerated treatment well   Assessment Treatment Assessment Pt participated in skilled OT tx session focused on sliding board transfers, [facility] transfers, toileting  See above for further details on functional performance  Pt cooperative this session, while pt c/o pain this does not limit his performance  Pt reports he is agreeable to stay for 2 weeks, he understands he is not stronger enough to go home  Pt has questions regarding if he can stand and walk using a cane, OT explained that 2* L LE NWB, as well as R ankle pain, standing in general would be difficult at this time, and with rib/sternal fxs this would not be recommended  Pt then in agreement and declines to even attempt standing with RW (in general OT is not recommending this but could trial  parallel bars if pt really wanted to attempt)  At end of session pt in agreement with plan for d/c home at w/c level, that [facility] would order [facility], hospital bed and w/c and potentially sliding board  He is in agreement to stay for ELOS of 2 weeks  OT called pt's daughter to setup family training and ask for pictures/measurements or video of pt's house  Left voicemail and requesting call back  Continue OT plan of care with plan for bed level LB ADL, sliding board transfers, core strengthening and UE strengthening focused on bicep/tricep 2* rib and sternal fx and pain  OT spoke with Dr Haley Hirsch, while Eddie Garza is supported in bed, knee immobilizer can be unstrapped to undo ace wrap and perform skin checks- OT notified YVONNE Bradshaw   Prognosis Fair   Problem List Decreased strength;Decreased endurance; Impaired balance;Decreased mobility; Decreased cognition;Orthopedic restrictions;Pain   Barriers to Discharge Decreased caregiver support; Inaccessible home environment   Plan   Treatment/Interventions ADL retraining;Functional transfer training; Endurance training;Patient/family training;Equipment eval/education; Compensatory technique education   Progress Slow progress, decreased activity tolerance   Recommendation OT Discharge Recommendation   (pending progress )   OT Therapy Minutes   OT Time In 1000   OT Time Out 1130   OT Total Time (minutes) 90   OT Mode of treatment - Individual (minutes) 90   OT Mode of treatment - Concurrent (minutes) 0   OT Mode of treatment - Group (minutes) 0   OT Mode of treatment - Co-treat (minutes) 0   OT Mode of Treatment - Total time(minutes) 90 minutes   OT Cumulative Minutes 190   Therapy Time missed   Time missed?  No

## 2022-03-24 NOTE — PLAN OF CARE
Problem: PAIN - ADULT  Goal: Verbalizes/displays adequate comfort level or baseline comfort level  Description: Interventions:  - Encourage patient to monitor pain and request assistance  - Assess pain using appropriate pain scale  - Administer analgesics based on type and severity of pain and evaluate response  - Implement non-pharmacological measures as appropriate and evaluate response  - Consider cultural and social influences on pain and pain management  - Notify physician/advanced practitioner if interventions unsuccessful or patient reports new pain  Outcome: Progressing     Problem: INFECTION - ADULT  Goal: Absence or prevention of progression during hospitalization  Description: INTERVENTIONS:  - Assess and monitor for signs and symptoms of infection  - Monitor lab/diagnostic results  - Monitor all insertion sites, i e  indwelling lines, tubes, and drains  - Monitor endotracheal if appropriate and nasal secretions for changes in amount and color  - [place] appropriate cooling/warming therapies per order  - Administer medications as ordered  - Instruct and encourage patient and family to use good hand hygiene technique  - Identify and instruct in appropriate isolation precautions for identified infection/condition  Outcome: Progressing  Goal: Absence of fever/infection during neutropenic period  Description: INTERVENTIONS:  - Monitor WBC    Outcome: Progressing     Problem: SAFETY ADULT  Goal: Patient will remain free of falls  Description: INTERVENTIONS:  - Educate patient/family on patient safety including physical limitations  - Instruct patient to call for assistance with activity   - Consult OT/PT to assist with strengthening/mobility   - Keep Call bell within reach  - Keep bed low and locked with side rails adjusted as appropriate  - Keep care items and personal belongings within reach  - Initiate and maintain comfort rounds  - Make Fall Risk Sign visible to staff  - Offer Toileting every  Hours, in advance of need  - Initiate/Maintain alarm  - Obtain necessary fall risk management equipment:   - Apply yellow socks and bracelet for high fall risk patients  - Consider moving patient to room near nurses station  Outcome: Progressing  Goal: Maintain or return to baseline ADL function  Description: INTERVENTIONS:  -  Assess patient's ability to carry out ADLs; assess patient's baseline for ADL function and identify physical deficits which impact ability to perform ADLs (bathing, care of mouth/teeth, toileting, grooming, dressing, etc )  - Assess/evaluate cause of self-care deficits   - Assess range of motion  - Assess patient's mobility; develop plan if impaired  - Assess patient's need for assistive devices and provide as appropriate  - Encourage maximum independence but intervene and supervise when necessary  - Involve family in performance of ADLs  - Assess for home care needs following discharge   - Consider OT consult to assist with ADL evaluation and planning for discharge  - Provide patient education as appropriate  Outcome: Progressing  Goal: Maintains/Returns to pre admission functional level  Description: INTERVENTIONS:  - Perform BMAT or MOVE assessment daily    - Set and communicate daily mobility goal to care team and patient/family/caregiver  - Collaborate with rehabilitation services on mobility goals if consulted  - Perform Range of Motion  times a day  - Reposition patient every  hours    - Dangle patient  times a day  - Stand patient  times a day  - Ambulate patien times a day  - Out of bed to chair  times a day   - Out of bed for meals  times a day  - Out of bed for toileting  - Record patient progress and toleration of activity level   Outcome: Progressing

## 2022-03-25 LAB — GLUCOSE SERPL-MCNC: 111 MG/DL (ref 65–140)

## 2022-03-25 PROCEDURE — 97535 SELF CARE MNGMENT TRAINING: CPT

## 2022-03-25 PROCEDURE — 82948 REAGENT STRIP/BLOOD GLUCOSE: CPT

## 2022-03-25 PROCEDURE — 97110 THERAPEUTIC EXERCISES: CPT

## 2022-03-25 PROCEDURE — 99232 SBSQ HOSP IP/OBS MODERATE 35: CPT | Performed by: INTERNAL MEDICINE

## 2022-03-25 PROCEDURE — 99233 SBSQ HOSP IP/OBS HIGH 50: CPT | Performed by: STUDENT IN AN ORGANIZED HEALTH CARE EDUCATION/TRAINING PROGRAM

## 2022-03-25 PROCEDURE — 97530 THERAPEUTIC ACTIVITIES: CPT

## 2022-03-25 RX ADMIN — NICOTINE 21 MG: 21 PATCH, EXTENDED RELEASE TRANSDERMAL at 21:15

## 2022-03-25 RX ADMIN — OXYCODONE HYDROCHLORIDE 15 MG: 10 TABLET ORAL at 15:58

## 2022-03-25 RX ADMIN — DICLOFENAC SODIUM 2 G: 10 GEL TOPICAL at 21:21

## 2022-03-25 RX ADMIN — MORPHINE SULFATE 105 MG: 15 TABLET, FILM COATED, EXTENDED RELEASE ORAL at 09:25

## 2022-03-25 RX ADMIN — LORATADINE AND PSEUDOEPHEDRINE 1 TABLET: 10; 240 TABLET, EXTENDED RELEASE ORAL at 09:34

## 2022-03-25 RX ADMIN — PANTOPRAZOLE SODIUM 40 MG: 40 TABLET, DELAYED RELEASE ORAL at 06:15

## 2022-03-25 RX ADMIN — METHOCARBAMOL TABLETS 750 MG: 750 TABLET, COATED ORAL at 06:15

## 2022-03-25 RX ADMIN — LEVOTHYROXINE SODIUM 150 MCG: 75 TABLET ORAL at 06:15

## 2022-03-25 RX ADMIN — OXYCODONE HYDROCHLORIDE 15 MG: 10 TABLET ORAL at 06:15

## 2022-03-25 RX ADMIN — STANDARDIZED SENNA CONCENTRATE 8.6 MG: 8.6 TABLET ORAL at 21:15

## 2022-03-25 RX ADMIN — MORPHINE SULFATE 105 MG: 15 TABLET, FILM COATED, EXTENDED RELEASE ORAL at 21:15

## 2022-03-25 RX ADMIN — POLYETHYLENE GLYCOL 3350 17 G: 17 POWDER, FOR SOLUTION ORAL at 09:27

## 2022-03-25 RX ADMIN — METHOCARBAMOL TABLETS 750 MG: 750 TABLET, COATED ORAL at 15:59
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
## 2022-03-25 NOTE — CASE MANAGEMENT
Pt is requesting to dc home on Monday  Cm received an order from therapy for a wheelchair, slide board, drop arm commode and hospital bed  Order placed with [facility]  W/c to deliver to pts room, all other items to pts home
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
## 2022-03-25 NOTE — PROGRESS NOTES
OT Note:      03/25/22 1410   Pain Assessment   Pain Assessment Tool 0-10   Pain Score 5   Pain Location/Orientation Location: Leg;Orientation: Left   Hospital Pain Intervention(s) Repositioned   Restrictions/Precautions   Precautions Bed/chair alarms; Fall Risk;Supervision on toilet/commode;Pain   Weight Bearing Restrictions Yes   LLE Weight Bearing Per Order NWB   Braces or Orthoses LE Immobilizer  (L LE)   Lifestyle   Autonomy "I need to sleep "    Sit to Lying   Type of Assistance Needed Supervision; Adaptive equipment   Physical Assistance Level No physical assistance   Comment pt used HOB raised high, needed verbal cue to lower knee portion of bed, pt supervision to roll to bed from EOB  Pt is ordering hospital bed for home   Sit to Lying CARE Score 4   Lying to Sitting on Side of Bed   Type of Assistance Needed Adaptive equipment;Supervision   Physical Assistance Level No physical assistance   Comment pt used HOB raised high, needed verbal cue to lower knee portion of bed, pt supervision to roll to EOB  Pt is ordering hospital bed for home   Lying to Sitting on Side of Bed CARE Score 4   Toileting Hygiene   Type of Assistance Needed Supervision   Physical Assistance Level No physical assistance   Comment pt demo'd pulling shorts up/down on BSC, pt did not need to use at this time  Toileting Hygiene CARE Score 4   Toilet Transfer   Type of Assistance Needed Supervision   Physical Assistance Level No physical assistance   Comment pt able to use slideboard to standard width drop arm commode  Toilet Transfer CARE Score 4   Cognition   Overall Cognitive Status Impaired   Arousal/Participation Alert; Cooperative   Attention Attends with cues to redirect   Orientation Level Oriented X4   Memory Decreased short term memory   Following Commands Follows one step commands with increased time or repetition   Activity Tolerance   Activity Tolerance Treatment limited secondary to agitation   Assessment   Treatment Assessment Pt engaged in skilled OT tx session  See above for further details on pts functional performance  Pt is supervision functional transfers/mobility  Pt remains limited due UE strength, core strength, and endurance deficits  Due to these deficits OT is recommending continue POC w/UE strengthening (only bicep/tricep curls due to rib fractures), bed level LB and EOB UB ADL retraining, function tx training  OT left voicemail for daughter again requesting call back and plan for d/c early next week, and would like to schedule family training this weekend, OT also called son's phone twice however no voicemail set up  Plan for d/c is home at W/C level w/home OT  Currently recommending for supervision w/slideboard tx as pt requested d/c on Monday  If pts family is present this weekend family training should include & supervision/assist for spongebath w/LB at bed level (train on how to bathe under knee immobilizer) & UE at EOB, supervision slide board tx training  OT initially attempted therapy at scheduled time, pt cursing and refusing therapy stating " I need to sleep " OT initiated therapy later pt was sleeping, therapy woke pt up and discussed need to determine the equipment needed for d/c home on Monday  Pt agreeable to do slideboard tx to [facility] for [facility]  Pt did make comment during session regarding feeling frustrated w/staff entering room so often while he was trying to sleep commenting along the lines that he was ready to "shoot someone," however pt was then able to be redirected and appropriate during remaining OT session  Dr Alvin Clay made aware  Prognosis Fair   Problem List Decreased strength;Decreased range of motion;Decreased endurance; Impaired balance;Decreased mobility;Obesity; Decreased skin integrity;Orthopedic restrictions;Pain; Impaired judgement   Barriers to Discharge Decreased caregiver support   Plan   Treatment/Interventions ADL retraining;Functional transfer training; Therapeutic exercise; Endurance training;Cognitive reorientation;Patient/family training;Equipment eval/education; Compensatory technique education;Continued evaluation   Recommendation   OT Discharge Recommendation Home with home health rehabilitation   Equipment Recommended Bedside commode  (standard drop arm)   Date ordered 03/25/22   OT Therapy Minutes   OT Time In 1410   OT Time Out 1450   OT Total Time (minutes) 40   OT Mode of treatment - Individual (minutes) 40   OT Mode of treatment - Concurrent (minutes) 0   OT Mode of treatment - Group (minutes) 0   OT Mode of treatment - Co-treat (minutes) 0   OT Mode of Treatment - Total time(minutes) 40 minutes   OT Cumulative Minutes 230   Therapy Time missed   Time missed?  Yes   Amount of time missed 50   Reason for time missed   (pt refusal 2 attempts made)   Time(s) multiple attempts made yes
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
## 2022-03-26 PROCEDURE — 97530 THERAPEUTIC ACTIVITIES: CPT

## 2022-03-26 PROCEDURE — 99232 SBSQ HOSP IP/OBS MODERATE 35: CPT | Performed by: INTERNAL MEDICINE

## 2022-03-26 RX ADMIN — OXYCODONE HYDROCHLORIDE 10 MG: 10 TABLET ORAL at 00:16

## 2022-03-26 RX ADMIN — OXYCODONE HYDROCHLORIDE 10 MG: 10 TABLET ORAL at 06:20

## 2022-03-26 RX ADMIN — SENNOSIDES 17.2 MG: 8.6 TABLET, FILM COATED ORAL at 08:18

## 2022-03-26 RX ADMIN — LACTULOSE 20 G: 20 SOLUTION ORAL at 21:47

## 2022-03-26 RX ADMIN — MORPHINE SULFATE 105 MG: 15 TABLET, FILM COATED, EXTENDED RELEASE ORAL at 08:18

## 2022-03-26 RX ADMIN — METHOCARBAMOL TABLETS 750 MG: 750 TABLET, COATED ORAL at 18:29

## 2022-03-26 RX ADMIN — ACETAMINOPHEN 975 MG: 325 TABLET ORAL at 06:20

## 2022-03-26 RX ADMIN — OXYCODONE HYDROCHLORIDE 15 MG: 10 TABLET ORAL at 12:36

## 2022-03-26 RX ADMIN — METHOCARBAMOL TABLETS 750 MG: 750 TABLET, COATED ORAL at 06:20

## 2022-03-26 RX ADMIN — MORPHINE SULFATE 105 MG: 15 TABLET, FILM COATED, EXTENDED RELEASE ORAL at 21:44

## 2022-03-26 RX ADMIN — OXYCODONE HYDROCHLORIDE 15 MG: 10 TABLET ORAL at 18:29

## 2022-03-26 RX ADMIN — METHOCARBAMOL TABLETS 750 MG: 750 TABLET, COATED ORAL at 00:17

## 2022-03-26 RX ADMIN — PANTOPRAZOLE SODIUM 40 MG: 40 TABLET, DELAYED RELEASE ORAL at 06:20

## 2022-03-26 RX ADMIN — NICOTINE 21 MG: 21 PATCH, EXTENDED RELEASE TRANSDERMAL at 21:45

## 2022-03-26 RX ADMIN — LEVOTHYROXINE SODIUM 150 MCG: 75 TABLET ORAL at 06:20

## 2022-03-26 RX ADMIN — STANDARDIZED SENNA CONCENTRATE 8.6 MG: 8.6 TABLET ORAL at 21:47

## 2022-03-26 RX ADMIN — LORATADINE AND PSEUDOEPHEDRINE 1 TABLET: 10; 240 TABLET, EXTENDED RELEASE ORAL at 08:19

## 2022-03-26 RX ADMIN — METHOCARBAMOL TABLETS 750 MG: 750 TABLET, COATED ORAL at 12:36

## 2022-03-26 RX ADMIN — POLYETHYLENE GLYCOL 3350 17 G: 17 POWDER, FOR SOLUTION ORAL at 08:18
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
## 2022-03-26 NOTE — NURSING NOTE
Pt yelling & not cooperative with OT in the room  Only wants his pain medications which I have offered to him now & wants to be left alone to sleep  States he hasn't slept in 6 days

## 2022-03-26 NOTE — PROGRESS NOTES
Pt resting in bed, complains of 10/10 pain, medications provided for pain relief  Pt in labile mood, willing to take all morning medications except the colace  Given new refreshments, pt verbalized they are not willing to work with PT/OT today because they simply just want to sleep and not be bothered every 30 mins  Will continue to monitor, safety measures within place

## 2022-03-26 NOTE — PROGRESS NOTES
03/26/22 1230   Pain Assessment   Pain Assessment Tool 0-10   Pain Score 10 - Worst Possible Pain   Pain Location/Orientation Orientation: Bilateral;Location: Generalized   Effect of Pain on Daily Activities Pt reports "I have no pain laying here but if I move its a 10"  Hospital Pain Intervention(s) Other (Comment)  (YVONNE Arguello present and admin meds)   Cognition   Overall Cognitive Status Impaired   Arousal/Participation Uncooperative; Other (Comment)  (agitated)   Comments inappropriate and cursing at therapist in front of RN and patients daughter   Assessment   Treatment Assessment OT attempted to see pt at 80 AM for 2nd attempt with pt yelling and cursing at therapist stating "leave me the F alone, I cant get any sleep, everyone keeps coming the F in"  OT made pt aware that a 3rd attempt will be made to do therapy in the afternoon as pt is here for therapy to be able to gain strength and go home, pt yelling "I am strong enough, I just need sleep, thats what I need, get out and go away"  3rd attempt made at 1230 PM with YVONNE Arguello present to give pt meds, pt telling OT to again "go the F away"  Education provided that pt does not need to speak to therapist in this manner with pt apologizing and then yelling at therapist again sating "I dont need to prove myself, I can leave whenever I want, call Dr Huma Adan and tell him to get me out of here, I'm done with this place"  Pt stating "Im not agreeable to therapy, leave me alone"  OT made pt aware that PT will attempt to see him later this afternoon and is pt open to this with pt again stating "no leave me alone, I refuse"  Phone call to on call Dr [place] to notify that pt is refusing therapy 3x and requesting to leave and cursing at therapist  Jane Fernandez also notified of same and unable to attempt PT at this time as pt is too agitated to participate  Therapy Time missed   Time missed?  Yes   Amount of time missed 80   Reason for time missed   (refusal 3x and cursing at therapist )   Time(s) multiple attempts made yes 830, 930, 1230

## 2022-03-26 NOTE — PROGRESS NOTES
03/26/22 0830   Assessment   Treatment Assessment Therapy session for bathing and dressing attempted  Pt yelling at therapist stating "leave me the f** alone, I need sleep, every f** half hour someone is in here bothering me, please I need sleep, get out"  Pt stating "Im going to get out of here, I need to leave this place"  OT provided education on reason for rehab stay and need to participate in Therapy in order to recover and go home at safest level  Pt stating "Im strong enough, I dont need therapy, I need sleep"  OT offered to have pt sit up EOB and brush teeth and just wash up and dress  Pt stating "NO, my kids are coming later they can help me"  OT provided education that therapist and staff are here to work with pt and assist pt, pt still refusing and yelling at therapist stating "you are raising my blood pressure, let me f** sleep" and not able to be redirected  OT notified pt that OT will come back in another half hour with pt stating "thats too soon, at least give me another hour"  OT agreeable and made pt aware that OT will attempt in another hour  YVONNE Carlton aware that pt is refusing therapy  PT Mata also notified and advised to cluster pt care to be seen in 1 block rather than 2 separate blocked times with PT adjusting schedule for same      OT Therapy Minutes   OT Time In 0830   OT Time Out 0840   OT Total Time (minutes) 10   OT Mode of treatment - Individual (minutes) 10   OT Mode of treatment - Concurrent (minutes) 0   OT Mode of treatment - Group (minutes) 0   OT Mode of treatment - Co-treat (minutes) 0   OT Mode of Treatment - Total time(minutes) 10 minutes   OT Cumulative Minutes 240

## 2022-03-26 NOTE — PROGRESS NOTES
03/26/22 1300   Pain Assessment   Pain Score 6   Therapy Time missed   Time missed? Yes   Amount of time missed 90   Reason for time missed   (Pt refusing therapy and agitated   2nd attempt pt is sleeping)   Time(s) multiple attempts made yes

## 2022-03-27 ENCOUNTER — TELEPHONE (OUTPATIENT)
Dept: OTHER | Facility: OTHER | Age: 62
End: 2022-03-27

## 2022-03-27 LAB — GLUCOSE SERPL-MCNC: 117 MG/DL (ref 65–140)

## 2022-03-27 PROCEDURE — 99232 SBSQ HOSP IP/OBS MODERATE 35: CPT | Performed by: INTERNAL MEDICINE

## 2022-03-27 PROCEDURE — 97530 THERAPEUTIC ACTIVITIES: CPT

## 2022-03-27 PROCEDURE — 82948 REAGENT STRIP/BLOOD GLUCOSE: CPT

## 2022-03-27 PROCEDURE — 97535 SELF CARE MNGMENT TRAINING: CPT

## 2022-03-27 RX ADMIN — STANDARDIZED SENNA CONCENTRATE 8.6 MG: 8.6 TABLET ORAL at 21:58

## 2022-03-27 RX ADMIN — METHOCARBAMOL TABLETS 750 MG: 750 TABLET, COATED ORAL at 06:07

## 2022-03-27 RX ADMIN — NICOTINE 21 MG: 21 PATCH, EXTENDED RELEASE TRANSDERMAL at 21:57

## 2022-03-27 RX ADMIN — MORPHINE SULFATE 105 MG: 15 TABLET, FILM COATED, EXTENDED RELEASE ORAL at 09:54

## 2022-03-27 RX ADMIN — METHOCARBAMOL TABLETS 750 MG: 750 TABLET, COATED ORAL at 15:15

## 2022-03-27 RX ADMIN — METHOCARBAMOL TABLETS 750 MG: 750 TABLET, COATED ORAL at 18:50

## 2022-03-27 RX ADMIN — ACETAMINOPHEN 975 MG: 325 TABLET ORAL at 06:07

## 2022-03-27 RX ADMIN — LEVOTHYROXINE SODIUM 150 MCG: 75 TABLET ORAL at 06:07

## 2022-03-27 RX ADMIN — MORPHINE SULFATE 105 MG: 15 TABLET, FILM COATED, EXTENDED RELEASE ORAL at 21:58

## 2022-03-27 RX ADMIN — ACETAMINOPHEN 975 MG: 325 TABLET ORAL at 21:58

## 2022-03-27 RX ADMIN — PANTOPRAZOLE SODIUM 40 MG: 40 TABLET, DELAYED RELEASE ORAL at 06:07

## 2022-03-27 RX ADMIN — METHOCARBAMOL TABLETS 750 MG: 750 TABLET, COATED ORAL at 00:23

## 2022-03-27 RX ADMIN — OXYCODONE HYDROCHLORIDE 15 MG: 10 TABLET ORAL at 15:15

## 2022-03-27 NOTE — PROGRESS NOTES
03/27/22 7059   Pain Assessment   Pain Assessment Tool 0-10   Pain Score 6   Pain Location/Orientation Orientation: Left; Location: Back; Location: Leg;Location: Rib Cage   Pain Onset/Description Onset: Ongoing;Frequency: Constant/Continuous; Descriptor: Sore;Descriptor: Aching;Descriptor: Cramping   Effect of Pain on Daily Activities limits tolerance to repositioning in bed, transfers, ADL but overall able to engage this [facility] Pain Intervention(s) Repositioned; Rest   Restrictions/Precautions   Precautions Bed/chair alarms; Fall Risk;Supervision on toilet/commode;Pain   LLE Weight Bearing Per Order NWB   Braces or Orthoses LE Immobilizer  (LLE, ace wrap L leg, ace wrap R ankle)   Lifestyle   Autonomy "this is so freaking wack"   Eating   Type of Assistance Needed Independent   Physical Assistance Level No physical assistance   Eating CARE Score 6   Oral Hygiene   Type of Assistance Needed Independent   Physical Assistance Level No physical assistance   Comment encouragement to perform   Oral Hygiene CARE Score 6   Shower/Bathe Self   Type of Assistance Needed Physical assistance   Physical Assistance Level 26%-50%   Comment completing bathing bed level  Cont to req A for rear hygiene 2* body habitus, LLE WB restrictions  Also req A for LLE hygiene  Shower/Bathe Self CARE Score 3   Bathing   Assessed Bath Style Sponge Bath   Anticipated D/C Bath Style Sponge Bath   Tub/Shower Transfer   Reason Not Assessed Sponge Bath;Medical   Upper Body Dressing   Type of Assistance Needed Set-up / clean-up   Physical Assistance Level No physical assistance   Upper Body Dressing CARE Score 5   Lower Body Dressing   Type of Assistance Needed Physical assistance   Physical Assistance Level 76% or more   Comment cont to req A for threading LEs, management of immobilizer      Lower Body Dressing CARE Score 2   Putting On/Taking Off Footwear   Type of Assistance Needed Physical assistance   Physical Assistance Level Total assistance   Comment A for ace wrapping, brace, socks in bed   Putting On/Taking Off Footwear CARE Score 1   Roll Left and Right   Type of Assistance Needed Supervision   Physical Assistance Level No physical assistance   Comment reliance on bed rails   Roll Left and Right CARE Score 4   Sit to Lying   Type of Assistance Needed Supervision   Physical Assistance Level No physical assistance   Sit to Lying CARE Score 4   Lying to Sitting on Side of Bed   Type of Assistance Needed Supervision   Physical Assistance Level No physical assistance   Lying to Sitting on Side of Bed CARE Score 4   Sit to Stand   Reason if not Attempted Safety concerns   Sit to Stand CARE Score 88   Bed-Chair Transfer   Type of Assistance Needed Set-up / clean-up   Physical Assistance Level No physical assistance   Comment able to complete transfer at S level with slideboard this session  Able to verbalize technique as practice to guide family when providing assistance   Chair/Bed-to-Chair Transfer CARE Score 5   Toileting Hygiene   Type of Assistance Needed Supervision   Physical Assistance Level No physical assistance   Comment while on platform Uus 6 Score 4   Toilet Transfer   Type of Assistance Needed Supervision   Physical Assistance Level No physical assistance   Comment SB to BSC   Able to provide verbal cuing to set up transfer   Toilet Transfer CARE Score 4   Cognition   Overall Cognitive Status Impaired   Arousal/Participation Cooperative  (with encouragement)   Attention Attends with cues to redirect   Orientation Level Oriented X4   Memory Decreased short term memory   Following Commands Follows one step commands with increased time or repetition   Comments Req extended time and distraction to engage in session   Activity Tolerance   Activity Tolerance Patient limited by pain   Assessment   Treatment Assessment Pt engaged in skilled OT session with focus on transfers, LLE management and skin care, bed level ADL  On entry, pt reporting significant discomfort with bunching of ace wrap on LLE under knee immobilizer  Spoke with RN Veronika Arguello who ok'd this OT to remove ace wrap and dressing with leg supported and stabilized in bed to clean skin and apply new dressing  RN present to take pictures of incisions and assess skin  After cleaning incision with saline and intact skin with soap and warm water, leg thoroughly dried before reapplying ABD pads to incision and ace wrapping with gentle compression from toe to thigh  Ace wrap also applied to R ankle for edema management and stability  At this time, pt is demo ability to transfer at S/set-up level while guiding staff how to set-up demo inc ability to guide family at dc  Will cont to req inc assistance for all ADL tasks 2* LLE restrictions, pain, body habitus and cont to report that he'll be fine and his family with assist at dc  Repeating throughout session that he "will be leaving for home tomorrow" and "my family will be there " Pt would cont to benefit from skilled therapy with focus on dec burden of care upon dc home with family support and home therapy  Pt in bed at end of session with all needs met  Prognosis Fair   Problem List Decreased strength;Decreased range of motion;Decreased endurance; Impaired balance;Decreased mobility;Obesity; Decreased skin integrity;Orthopedic restrictions;Pain; Impaired judgement   Barriers to Discharge Decreased caregiver support   Plan   Treatment/Interventions ADL retraining;Functional transfer training; Therapeutic exercise; Endurance training;Patient/family training;Equipment eval/education; Bed mobility   Progress Progressing toward goals   Recommendation   OT Discharge Recommendation Home with home health rehabilitation   OT Therapy Minutes   OT Time In 0930   OT Time Out 1100   OT Total Time (minutes) 90   OT Mode of treatment - Individual (minutes) 90   OT Mode of treatment - Concurrent (minutes) 0   OT Mode of treatment - Group (minutes) 0   OT Mode of treatment - Co-treat (minutes) 0   OT Mode of Treatment - Total time(minutes) 90 minutes   OT Cumulative Minutes 330   Therapy Time missed   Time missed?  No

## 2022-03-27 NOTE — PROGRESS NOTES
Internal Medicine Progress Note  Patient: Angela Quintanilla  Age/sex: 64 y o  male  Medical Record #: [id number]      ASSESSMENT/PLAN: (Interval History)  Angela Quintanilla is seen and examined and management for following issues:    Left distal femur fracture  · S/p ORIF 3/16/22  · NWB  · Continue KI      Sternal fracture with SQ/retrosternal/pericardial hematomas  · Pain improving   · ECHO done day after admission showed no pericardial effusion, LVEF was 75% and wall motion was normal      Multiple rib fractures  · Right 4-9 and left 3rd/5th/8th  · Continue IS     Bilateral hip contusions/abdominal wall contusion   · improved     Right ankle pain  · Xray showed "small medial and lateral malleolar avulsions, likely chronic"  · Also has heel spur  · stable     Hypothyroidism  · Continue Levothyroxine 150mcg qd     Acute/chronic pain  · +opioid dependence  · Has had multiple back surgeries in the past and has chr back pain  · At home he takes MS Contin 100mg q12 hrs and prn Percocet 10/325  · Management per Dr Jake CHRISTINA  · Stable     Nicotine dependence  · Smokes 1 PPD   · Continue Nicotine patch     Hepatomegaly/gastritis  · Continue PPI  · OP followup     Enlarged prostate  · Has intermittent hematuria if exerts himself; says worked up by [employer] in past for it 10 years ago by nobody around here but says they found nothing  · Urine is brown     Constipation  · Refuses recommended meds  · Per PMR        Discharge date:  Reteam    The above assessment and plan was reviewed and updated as determined by my evaluation of the patient on 3/27/2022      Labs:   Results from last 7 days   Lab Units 03/24/22  0543 03/21/22  0455   WBC Thousand/uL 11 98* 12 93*   HEMOGLOBIN g/dL 9 0* 8 8*   HEMATOCRIT % 27 8* 27 2*   PLATELETS Thousands/uL 331 250     Results from last 7 days   Lab Units 03/24/22  0543 03/20/22  1557   SODIUM mmol/L 139 138   POTASSIUM mmol/L 3 5 3 5   CHLORIDE mmol/L 106 105   CO2 mmol/L 27 26   BUN mg/dL 14 13 CREATININE mg/dL 0 68 0 58*   CALCIUM mg/dL 8 7 8 5             Results from last 7 days   Lab Units 03/27/22  0617 03/25/22  0642   POC GLUCOSE mg/dl 117 111       Review of Scheduled Meds:  Current Facility-Administered Medications   Medication Dose Route Frequency Provider Last Rate    acetaminophen  975 mg Oral Q8H [facility], DO      bisacodyl  10 mg Rectal Daily PRN [facility], DO      calcium carbonate  500 mg Oral Daily PRN [facility], DO      Diclofenac Sodium  2 g Topical TID [facility], DO      docusate sodium  100 mg Oral BID [facility], DO      docusate sodium  100 mg Oral Once [facility], DO      enoxaparin  30 mg Subcutaneous Q12H [facility], DO      lactulose  20 g Oral Daily PRN [facility], DO      levothyroxine  150 mcg Oral Early Morning [facility], DO      loratadine-pseudoephedrine  1 tablet Oral Daily [facility], DO      magnesium citrate  148 mL Oral Once [facility], DO      methocarbamol  750 mg Oral Q6H [facility], DO      morphine  105 mg Oral Q12H [facility], DO      nicotine  21 mg Transdermal Daily [facility], DO      oxyCODONE  10 mg Oral Q4H PRN [facility], DO      oxyCODONE  10 mg Oral Once [facility], DO      oxyCODONE  15 mg Oral Q4H PRN [facility], DO      pantoprazole  40 mg Oral Early Morning [facility], DO      polyethylene glycol  17 g Oral Daily [facility], DO      polyethylene glycol  17 g Oral Once [facility], DO      senna  2 tablet Oral Daily [facility], DO      senna  1 tablet Oral HS [facility], DO         Subjective/ HPI: Patient seen and examined  Patients overnight issues or events were reviewed with nursing or staff during rounds or morning huddle session  New or overnight issues include the following:     Pt seen and examined, yesterday refused therapy and became agitated  R    ROS:   A 10 point ROS was performed; negative except as noted above         Imaging: No orders to display       *Labs /Radiology studies reviewed  *Medications reviewed and reconciled as needed  *Please refer to order section for additional ordered labs studies  *Case discussed with primary attending during morning huddle case rounds    Physical Examination:  Vitals:   Vitals:    03/26/22 0618 03/26/22 1522 03/26/22 2201 03/27/22 0606   BP: 120/63 118/59 125/64 (!) 120/49   BP Location: Right arm Right arm Left arm Right arm   Pulse: 82 75 96 82   Resp: 18 16 18 20   Temp: 98 1 °F (36 7 °C) 98 5 °F (36 9 °C) 98 2 °F (36 8 °C) 98 7 °F (37 1 °C)   TempSrc: Oral Oral Oral Oral   SpO2: 93% 96% 95% 93%   Weight:       Height:           GEN: No apparent distress, interactive  NEURO: Alert and oriented x3  HEENT: Pupils are equal and reactive, EOMI, mucous membranes are moist, face symmetrical  CV: S1 S2 regular, no MRG, no peripheral edema noted  RESP: Lungs are clear bilaterally, no wheezes, rales or rhonchi noted, on room air, respirations easy and non labored  GI: Flat, soft non tender, non distended; +BS x4  : Voiding without difficulty  MUSC: Moves all extremities; +generalized deconditioning; except LLE  SKIN: pink, warm and dry, normal turgor, incision intact      The above physical exam was reviewed and updated as determined by my evaluation of the patient on 3/27/2022  Invasive Devices  Report    Drain            Closed/Suction Drain Anterior; Left Knee Accordion 10 Fr  10 days                   VTE Pharmacologic Prophylaxis: Enoxaparin which he refuses intermittently and has been counseled the risk of DVT/PE/death  Code Status: Level 1 - Full Code  Current Length of Stay: 5 day(s)      Total time spent:  30 minutes with more than 50% spent counseling/coordinating care  Counseling includes discussion with patient re: progress  and discussion with patient of his/her current medical state/information  Coordination of patient's care was performed in conjunction with primary service   Time invested included review of patient's labs, vitals, and management of their comorbidities with continued monitoring  In addition, this patient was discussed with medical team including physician and advanced extenders  The care of the patient was extensively discussed and appropriate treatment plan was formulated unique for this patient  ** Please Note:  voice to text software may have been used in the creation of this document   Although proof errors in transcription or interpretation are a potential of such software**

## 2022-03-27 NOTE — PROGRESS NOTES
03/27/22 1401   Pain Assessment   Pain Assessment Tool FLACC   Pain Rating: FLACC (Rest) - Face 0   Pain Rating: FLACC (Rest) - Legs 0   Pain Rating: FLACC (Rest) - Activity 0   Pain Rating: FLACC (Rest) - Cry 0   Pain Rating: FLACC (Rest) - Consolability 0   Score: FLACC (Rest) 0   Pain Rating: FLACC (Activity) - Face 1   Pain Rating: FLACC (Activity) - Legs 0   Pain Rating: FLACC (Activity) - Activity 0   Pain Rating: FLACC (Activity) - Cry 0   Pain Rating: FLACC (Activity) - Consolability 0   Score: FLACC (Activity) 1   Restrictions/Precautions   Precautions Bed/chair alarms; Fall Risk;Supervision on toilet/commode;Pain   Weight Bearing Restrictions Yes   LLE Weight Bearing Per Order NWB  (KI)   Braces or Orthoses LE Immobilizer  (LLE, ace wrap L leg, ace wrap )   Cognition   Overall Cognitive Status Impaired   Subjective   Subjective "I'm glad it's you here to see me "    Sit to Lying   Type of Assistance Needed Set-up / clean-up   Physical Assistance Level No physical assistance   Sit to Lying CARE Score 5   Lying to Sitting on Side of Bed   Type of Assistance Needed Set-up / clean-up   Physical Assistance Level No physical assistance   Lying to Sitting on Side of Bed CARE Score 5   Sit to Stand   Comment Performed 1x partial stand at [facility] with BUE and RLE support, patient able to complete- will recommend trial of standing in // bars next session if patient permits    Bed-Chair Transfer   Type of Assistance Needed Supervision;Verbal cues   Physical Assistance Level No physical assistance   Comment slide olyd transfer    Chair/Bed-to-Chair Transfer CARE Score 4   Transfer Bed/Chair/Wheelchair   Limitations Noted In Endurance;Pain Management;Problem Solving; Sequencing   Adaptive Equipment Transfer Board   Walk 10 Feet   Reason if not Attempted Safety concerns   Walk 10 Feet CARE Score 88   Walk 50 Feet with Two Turns   Reason if not Attempted Safety concerns   Walk 50 Feet with Two Turns CARE Score 88   Walk 150 Feet   Reason if not Attempted Safety concerns   Walk 150 Feet CARE Score 88   Walking 10 Feet on Uneven Surfaces   Reason if not Attempted Safety concerns   Walking 10 Feet on Uneven Surfaces CARE Score 88   Ambulation   Does the patient walk? 0  No, and walking goal is not clinically indicated  Wheelchair mobility   Does the patient use a wheelchair? 1  Yes   Type of Wheelchair Used 1  Manual   Findings not performed this session    Curb or Single Stair   Reason if not Attempted Safety concerns   1 Step (Curb) CARE Score 88   4 Steps   Reason if not Attempted Safety concerns   4 Steps CARE Score 88   12 Steps   Reason if not Attempted Safety concerns   12 Steps CARE Score 88   Toilet Transfer   Type of Assistance Needed Supervision;Verbal cues   Physical Assistance Level No physical assistance   Toilet Transfer CARE Score 4   Other Comments   Comments PT able to speak with daughter on the phone in PM  Notified her someone will reach out to her tomorrow to update her on discharge planning and if necessary schedule training   Assessment   Treatment Assessment Patient engaged in PT treatment session with main focus placed on slide board transfer and use of BSC  Patient able to complete with S and verbal cues without physical assistance  Patient with good understanding of technique  He does not garcía the transfer and appears to be more focus to task in comparison to evaluation  Patient asking PT whether his bed will be delivered to his home tomorrow  He is adamant about d/c tomorrow; will follow up with CM and MD as this was expressed prior to weekend  From a PT perspective, patient safe to d/c home with the appropriate equipment  Order placed for slide board, w/c and hospital bed  Patient wants w/c transport into home  Patient demonstrates ability to verbalize steps of transfer and appropriately asks for assistance from helper if needed   Will recommend HHPT services to allow for in home safety evaluation and review with caregiver any necessary education  Patient's daughter was visiting yesterday but patient does not feel she will be able to return for training  Patient appropriate for d/c when approved by MD     PT Barriers   Functional Limitation Car transfers; Ramp negotiation   Plan   Treatment/Interventions Functional transfer training   Progress Progressing toward goals   Recommendation   PT Discharge Recommendation Home with home health rehabilitation   Equipment Recommended Wheelchair   PT Equipment ordered w/c, slide board and hospital bed    Date ordered 03/25/22   PT Therapy Minutes   PT Time In 1400   PT Time Out 1510   PT Total Time (minutes) 70   PT Mode of treatment - Individual (minutes) 70   PT Mode of treatment - Concurrent (minutes) 0   PT Mode of treatment - Group (minutes) 0   PT Mode of treatment - Co-treat (minutes) 0   PT Mode of Treatment - Total time(minutes) 70 minutes   PT Cumulative Minutes 280

## 2022-03-28 ENCOUNTER — APPOINTMENT (INPATIENT)
Dept: RADIOLOGY | Facility: HOSPITAL | Age: 62
DRG: 560 | End: 2022-03-28
Payer: MEDICARE

## 2022-03-28 LAB
ANION GAP SERPL CALCULATED.3IONS-SCNC: 4 MMOL/L (ref 4–13)
BASOPHILS # BLD AUTO: 0.05 THOUSANDS/ΜL (ref 0–0.1)
BASOPHILS NFR BLD AUTO: 0 % (ref 0–1)
BUN SERPL-MCNC: 14 MG/DL (ref 5–25)
CALCIUM SERPL-MCNC: 8.7 MG/DL (ref 8.3–10.1)
CHLORIDE SERPL-SCNC: 105 MMOL/L (ref 100–108)
CO2 SERPL-SCNC: 27 MMOL/L (ref 21–32)
CREAT SERPL-MCNC: 0.58 MG/DL (ref 0.6–1.3)
EOSINOPHIL # BLD AUTO: 0.22 THOUSAND/ΜL (ref 0–0.61)
EOSINOPHIL NFR BLD AUTO: 2 % (ref 0–6)
ERYTHROCYTE [DISTWIDTH] IN BLOOD BY AUTOMATED COUNT: 16.7 % (ref 11.6–15.1)
GFR SERPL CREATININE-BSD FRML MDRD: 110 ML/MIN/1.73SQ M
GLUCOSE SERPL-MCNC: 109 MG/DL (ref 65–140)
HCT VFR BLD AUTO: 29.4 % (ref 36.5–49.3)
HGB BLD-MCNC: 8.9 G/DL (ref 12–17)
IMM GRANULOCYTES # BLD AUTO: 0.1 THOUSAND/UL (ref 0–0.2)
IMM GRANULOCYTES NFR BLD AUTO: 1 % (ref 0–2)
LYMPHOCYTES # BLD AUTO: 2.76 THOUSANDS/ΜL (ref 0.6–4.47)
LYMPHOCYTES NFR BLD AUTO: 23 % (ref 14–44)
MCH RBC QN AUTO: 29.4 PG (ref 26.8–34.3)
MCHC RBC AUTO-ENTMCNC: 30.3 G/DL (ref 31.4–37.4)
MCV RBC AUTO: 97 FL (ref 82–98)
MONOCYTES # BLD AUTO: 0.89 THOUSAND/ΜL (ref 0.17–1.22)
MONOCYTES NFR BLD AUTO: 7 % (ref 4–12)
NEUTROPHILS # BLD AUTO: 7.93 THOUSANDS/ΜL (ref 1.85–7.62)
NEUTS SEG NFR BLD AUTO: 67 % (ref 43–75)
NRBC BLD AUTO-RTO: 0 /100 WBCS
PLATELET # BLD AUTO: 318 THOUSANDS/UL (ref 149–390)
PMV BLD AUTO: 9.2 FL (ref 8.9–12.7)
POTASSIUM SERPL-SCNC: 3.2 MMOL/L (ref 3.5–5.3)
RBC # BLD AUTO: 3.03 MILLION/UL (ref 3.88–5.62)
SODIUM SERPL-SCNC: 136 MMOL/L (ref 136–145)
WBC # BLD AUTO: 11.95 THOUSAND/UL (ref 4.31–10.16)

## 2022-03-28 PROCEDURE — 80048 BASIC METABOLIC PNL TOTAL CA: CPT | Performed by: NURSE PRACTITIONER

## 2022-03-28 PROCEDURE — 97530 THERAPEUTIC ACTIVITIES: CPT

## 2022-03-28 PROCEDURE — 99232 SBSQ HOSP IP/OBS MODERATE 35: CPT | Performed by: INTERNAL MEDICINE

## 2022-03-28 PROCEDURE — 97535 SELF CARE MNGMENT TRAINING: CPT

## 2022-03-28 PROCEDURE — 85025 COMPLETE CBC W/AUTO DIFF WBC: CPT | Performed by: NURSE PRACTITIONER

## 2022-03-28 PROCEDURE — 97110 THERAPEUTIC EXERCISES: CPT

## 2022-03-28 PROCEDURE — 73552 X-RAY EXAM OF FEMUR 2/>: CPT

## 2022-03-28 PROCEDURE — 99233 SBSQ HOSP IP/OBS HIGH 50: CPT | Performed by: STUDENT IN AN ORGANIZED HEALTH CARE EDUCATION/TRAINING PROGRAM

## 2022-03-28 RX ORDER — POTASSIUM CHLORIDE 20 MEQ/1
40 TABLET, EXTENDED RELEASE ORAL ONCE
Status: COMPLETED | OUTPATIENT
Start: 2022-03-28 | End: 2022-03-28

## 2022-03-28 RX ADMIN — MORPHINE SULFATE 105 MG: 15 TABLET, FILM COATED, EXTENDED RELEASE ORAL at 08:12

## 2022-03-28 RX ADMIN — ACETAMINOPHEN 975 MG: 325 TABLET ORAL at 13:21

## 2022-03-28 RX ADMIN — PANTOPRAZOLE SODIUM 40 MG: 40 TABLET, DELAYED RELEASE ORAL at 05:32

## 2022-03-28 RX ADMIN — METHOCARBAMOL TABLETS 750 MG: 750 TABLET, COATED ORAL at 18:05

## 2022-03-28 RX ADMIN — METHOCARBAMOL TABLETS 750 MG: 750 TABLET, COATED ORAL at 05:32

## 2022-03-28 RX ADMIN — LEVOTHYROXINE SODIUM 150 MCG: 75 TABLET ORAL at 05:32

## 2022-03-28 RX ADMIN — METHOCARBAMOL TABLETS 750 MG: 750 TABLET, COATED ORAL at 11:53

## 2022-03-28 RX ADMIN — OXYCODONE HYDROCHLORIDE 15 MG: 10 TABLET ORAL at 11:53

## 2022-03-28 RX ADMIN — METHOCARBAMOL TABLETS 750 MG: 750 TABLET, COATED ORAL at 00:36

## 2022-03-28 RX ADMIN — MORPHINE SULFATE 105 MG: 15 TABLET, FILM COATED, EXTENDED RELEASE ORAL at 20:36

## 2022-03-28 RX ADMIN — OXYCODONE HYDROCHLORIDE 15 MG: 10 TABLET ORAL at 18:04

## 2022-03-28 RX ADMIN — ACETAMINOPHEN 975 MG: 325 TABLET ORAL at 05:32

## 2022-03-28 RX ADMIN — POTASSIUM CHLORIDE 40 MEQ: 20 TABLET, EXTENDED RELEASE ORAL at 09:38

## 2022-03-28 RX ADMIN — METHOCARBAMOL TABLETS 750 MG: 750 TABLET, COATED ORAL at 23:44

## 2022-03-28 RX ADMIN — LORATADINE AND PSEUDOEPHEDRINE 1 TABLET: 10; 240 TABLET, EXTENDED RELEASE ORAL at 08:14

## 2022-03-28 RX ADMIN — NICOTINE 21 MG: 21 PATCH, EXTENDED RELEASE TRANSDERMAL at 20:37

## 2022-03-28 NOTE — PROGRESS NOTES
OT Note:      03/28/22 0710   Restrictions/Precautions   Precautions Fall Risk;Bed/chair alarms;Supervision on toilet/commode   LLE Weight Bearing Per Order NWB  (L LE Knee Immobilizer)   Braces or Orthoses LE Immobilizer  (L LE)   Lifestyle   Autonomy "my kid is on his way"   Eating   Type of Assistance Needed Independent   Physical Assistance Level No physical assistance   Eating CARE Score 6   Oral Hygiene   Type of Assistance Needed Set-up / clean-up   Physical Assistance Level No physical assistance   Comment seated at EOB used mouthwash   Oral Hygiene CARE Score 5   Grooming   Able To Brush/Clean Teeth   Shower/Bathe Self   Type of Assistance Needed Physical assistance   Physical Assistance Level 26%-50%   Comment Pt declined full ADL as he "washed up last night and put new clothes on " Per yesterdays note pt able to complete bathing at bed level  Need A for rear hygiene, and LE due to orthopedic restrictions  OT bathed Bilateral LE  OT removed ace wrap on L LE and Knee immobilizer to bathe L LE  Pt will require A for LB bathing upon d/c  Pt able to bathe groin while supine in bed  OT did not perform incisional care to L LE, per RN Génesis Mooney she will clarify what orders for incisional care  Shower/Bathe Self CARE Score 3   Bathing   Assessed Bath Style Sponge Bath   Anticipated D/C Bath Style Sponge Bath   Able to Wash/Rinse/Dry (body part) Left Arm;Right Arm;R Upper Leg;Chest;Abdomen;Perineal Area; Buttocks   Positioning Supine;Seated   Tub/Shower Transfer   Reason Not Assessed Sponge Bath;Medical  (knee immobilizers)   Upper Body Dressing   Type of Assistance Needed Supervision   Physical Assistance Level No physical assistance   Comment seated on EOB   Upper Body Dressing CARE Score 4   Lower Body Dressing   Type of Assistance Needed Physical assistance   Physical Assistance Level 76% or more   Comment Pt declined full ADL as he put new clothes on last night   Per yesterdays notes, pt needs A to thread bilateral LE and manage Knee Immobilizer  Pt able to bridge for CM over hips  Lower Body Dressing CARE Score 2   Putting On/Taking Off Footwear   Type of Assistance Needed Physical assistance   Physical Assistance Level Total assistance   Comment pt requires A For wrapping bilateral LE and don/doff R sock   Putting On/Taking Off Footwear CARE Score 1   Sit to Lying   Type of Assistance Needed Physical assistance   Physical Assistance Level 25% or less   Comment pt need A for L LE to come back into bed, OT practiced as if he was directed his daughter to help him get back in bed  OT advised pt was low on bed, pt lowered HOB and used UE to boost on R side in bed while OT managed L LE   Sit to Lying CARE Score 3   Lying to Sitting on Side of Bed   Type of Assistance Needed Supervision   Physical Assistance Level No physical assistance   Comment pt able to use HOB to roll to EOB using Bed rail  Lying to Sitting on Side of Bed CARE Score 4   Sit to Stand   Comment pt NWB on L LE and has pain due to bruising on R LE   Reason if not Attempted Safety concerns   Sit to Stand CARE Score 88   Toileting Hygiene   Type of Assistance Needed Supervision   Physical Assistance Level No physical assistance   Comment pt able to weight shift for CM over hips, pt able to perform hygiene seated on standard drop arm BSC    Toileting Hygiene CARE Score 4   Toilet Transfer   Type of Assistance Needed Supervision   Physical Assistance Level No physical assistance   Comment OT had pt teachback how to tx from bed to standard drop arm BSC  Pt able to drop arm of commode after OT placed near bed  Pt verbalized needing a stool and said he has a similiar stool at home  Pt then placed Slideboard under him and advised OT to keep BSC still  pt able to slide on to [facility] and asked for stool to be placed under his L LE  Pt then lizet to move stool out of way, and asked OT to place slidebaord under his bottom and hold BSC still   Pt able to use slideboard to tx back to bed   Toilet Transfer CARE Score 4   Cognition   Overall Cognitive Status Impaired   Arousal/Participation Cooperative   Attention Attends with cues to redirect   Orientation Level Oriented X4   Memory Decreased short term memory   Following Commands Follows one step commands with increased time or repetition   Activity Tolerance   Activity Tolerance Patient tolerated treatment well   Assessment   Treatment Assessment Pt engaged in skilled OT tx session  See above for further details on pts functional performance  Pt is set up assist for oral hygiene, pt is min A for Slideboard tx to [facility] pt requires A for LE bathing/dressing and for rear hygiene  At end of session pt notified staff that son was on his way, OT will follow up w/son for brief family training as pt wants to d/c today  Plan for d/c is home w/supervision & Assist from children & home OT  Prognosis Fair   Problem List Decreased strength;Decreased range of motion;Decreased endurance; Impaired balance;Decreased mobility;Obesity; Decreased skin integrity;Orthopedic restrictions;Pain; Impaired judgement   Barriers to Discharge Decreased caregiver support   Plan   Treatment/Interventions ADL retraining;Functional transfer training; Therapeutic exercise;Cognitive reorientation;Patient/family training;Equipment eval/education; Compensatory technique education   Progress Progressing toward goals   Recommendation   OT Discharge Recommendation Home with home health rehabilitation   Equipment Recommended Bedside commode   Date ordered 03/25/22   OT - OK to Discharge Yes   OT Therapy Minutes   OT Time In 0810   OT Time Out 0910   OT Total Time (minutes) 60   OT Mode of treatment - Individual (minutes) 60   OT Mode of treatment - Concurrent (minutes) 0   OT Mode of treatment - Group (minutes) 0   OT Mode of treatment - Co-treat (minutes) 0   OT Mode of Treatment - Total time(minutes) 60 minutes   OT Cumulative Minutes 390   Therapy Time missed Time missed?  Yes   Amount of time missed 30  (pt declines further )

## 2022-03-28 NOTE — PLAN OF CARE
Problem: SAFETY ADULT  Goal: Patient will remain free of falls  Description: INTERVENTIONS:  - Educate patient/family on patient safety including physical limitations  - Instruct patient to call for assistance with activity   - Consult OT/PT to assist with strengthening/mobility   - Keep Call bell within reach  - Keep bed low and locked with side rails adjusted as appropriate  - Keep care items and personal belongings within reach  - Initiate and maintain comfort rounds  - Make Fall Risk Sign visible to staff  - Offer Toileting every 2-4  Hours, in advance of need  - Initiate/Maintain alarm  - Obtain necessary fall risk management equipment:   - Apply yellow socks and bracelet for high fall risk patients  - Consider moving patient to room near nurses station  Outcome: Progressing

## 2022-03-28 NOTE — PROGRESS NOTES
03/28/22 1230   Pain Assessment   Pain Assessment Tool 0-10   Pain Score 8   Pain Location/Orientation Orientation: Bilateral;Location: Leg   Restrictions/Precautions   Precautions Bed/chair alarms; Fall Risk;Cognitive;Pain;Supervision on toilet/commode   Weight Bearing Restrictions Yes   LLE Weight Bearing Per Order NWB  (KI)   Braces or Orthoses LE Immobilizer  (LLE)   Cognition   Overall Cognitive Status Impaired   Subjective   Subjective Patient agreeable to participate in PT session    Roll Left and Right   Type of Assistance Needed Independent   Physical Assistance Level No physical assistance   Roll Left and Right CARE Score 6   Sit to Lying   Type of Assistance Needed Set-up / clean-up   Physical Assistance Level No physical assistance   Sit to Lying CARE Score 5   Lying to Sitting on Side of Bed   Type of Assistance Needed Set-up / clean-up   Physical Assistance Level No physical assistance   Lying to Sitting on Side of Bed CARE Score 5   Sit to Stand   Comment 2* RLE ankle pain    Reason if not Attempted Refused to perform   Sit to Stand CARE Score 7   Bed-Chair Transfer   Type of Assistance Needed Set-up / clean-up   Physical Assistance Level No physical assistance   Chair/Bed-to-Chair Transfer CARE Score 5   Transfer Bed/Chair/Wheelchair   Limitations Noted In Endurance;Pain Management;LE Strength   Adaptive Equipment Transfer Board   Car Transfer   Comment PATIENT PAYING FOR TRANSPORT IN/OUT OF HOME    Reason if not Attempted Refused to perform   Car Transfer CARE Score 7   Walk 10 Feet   Reason if not Attempted Safety concerns   Walk 10 Feet CARE Score 88   Walk 50 Feet with Two Turns   Reason if not Attempted Safety concerns   Walk 50 Feet with Two Turns CARE Score 88   Walk 150 Feet   Reason if not Attempted Safety concerns   Walk 150 Feet CARE Score 88   Walking 10 Feet on Uneven Surfaces   Reason if not Attempted Safety concerns   Walking 10 Feet on Uneven Surfaces CARE Score 88   Ambulation Does the patient walk? 0  No, and walking goal is not clinically indicated  Wheel 50 Feet with Two Turns   Type of Assistance Needed Set-up / clean-up   Physical Assistance Level No physical assistance   Wheel 50 Feet with Two Turns CARE Score 5   Wheel 150 Feet   Type of Assistance Needed Set-up / clean-up   Physical Assistance Level No physical assistance   Wheel 150 Feet CARE Score 5   Wheelchair mobility   Does the patient use a wheelchair? 1  Yes   Type of Wheelchair Used 1  Manual   Curb or Single Stair   Reason if not Attempted Safety concerns   1 Step (Curb) CARE Score 88   4 Steps   Reason if not Attempted Safety concerns   4 Steps CARE Score 88   12 Steps   Reason if not Attempted Safety concerns   12 Steps CARE Score 88   Picking Up Object   Reason if not Attempted Safety concerns   Picking Up Object CARE Score 88   Therapeutic Interventions   Strengthening LLE DF/PF, circles clockwise and counterclockwise 3x20; RLE DF/PF, LAQ, hip flexion 3x12    Flexibility LLE heel cord stretch TERT 4 minutes    Assessment   Treatment Assessment Patient engaged in PT treatment session with focus on prepping for discharge  Patient to d/c home 3/29/22 with HHPT services  DME order placed for hospital bed, w/c and slide board  Patient requires set up assist for transfers and mobility for equipment management (ie  Donning/doffing foot rests)  Patient declining standing 2* RLE pain and declining practice of car transfer as he is paying for transportation  OT able to complete family training this AM with son  Patient provided with HEP for L ankle mobility and RLE full leg strengthening this session      Recommendation   PT Discharge Recommendation Home with home health rehabilitation   Equipment Recommended Wheelchair   PT Equipment ordered W/c, slide board, hospital bed    Date ordered 03/25/22   PT Therapy Minutes   PT Time In 1230   PT Time Out 1410   PT Total Time (minutes) 100   PT Mode of treatment - Individual (minutes) 100   PT Mode of treatment - Concurrent (minutes) 0   PT Mode of treatment - Group (minutes) 0   PT Mode of treatment - Co-treat (minutes) 0   PT Mode of Treatment - Total time(minutes) 100 minutes   PT Cumulative Minutes 380

## 2022-03-28 NOTE — PROGRESS NOTES
Internal Medicine Progress Note  Patient: Amy Bell  Age/sex: 64 y o  male  Medical Record #: [id number]      ASSESSMENT/PLAN: (Interval History)  Amy Bell is seen and examined and management for following issues:    Left distal femur fracture  · S/p ORIF 3/16/22  · NWB  · Continue KI   · Refused therapy entirely on 3/26 and was agitated, yelling at OT  Today did therapy      Sternal fracture with SQ/retrosternal/pericardial hematomas  · Pain improving   · ECHO done day after admission showed no pericardial effusion, LVEF was 75% and wall motion was normal      Multiple rib fractures  · Right 4-9 and left 3rd/5th/8th  · Continue IS     Bilateral hip contusions/abdominal wall contusion   · improved     Right ankle pain  · Xray showed "small medial and lateral malleolar avulsions, likely chronic"  · Also has heel spur  · stable     Hypothyroidism  · Continue Levothyroxine 150mcg qd     Acute/chronic pain  · +opioid dependence  · Has had multiple back surgeries in the past and has chr back pain  · At home he takes MS Contin 100mg q12 hrs and prn Percocet 10/325  · Management per Dr Bud CHRISTINA  · Stable     Nicotine dependence  · Smokes 1 PPD   · Continue Nicotine patch     Hepatomegaly/gastritis  · Continue PPI  · OP followup     Enlarged prostate  · Has intermittent hematuria if exerts himself; says worked up by [employer] in past for it 10 years ago by nobody around here but says they found nothing  · Urine is clear hossein     Constipation  · Refuses recommended meds  · Per PMR        Discharge date:  Reteam    The above assessment and plan was reviewed and updated as determined by my evaluation of the patient on 3/28/2022      Labs:   Results from last 7 days   Lab Units 03/28/22  0525 03/24/22  0543   WBC Thousand/uL 11 95* 11 98*   HEMOGLOBIN g/dL 8 9* 9 0*   HEMATOCRIT % 29 4* 27 8*   PLATELETS Thousands/uL 318 331     Results from last 7 days   Lab Units 03/28/22  0525 03/24/22  0543   SODIUM mmol/L 136 139   POTASSIUM mmol/L 3 2* 3 5   CHLORIDE mmol/L 105 106   CO2 mmol/L 27 27   BUN mg/dL 14 14   CREATININE mg/dL 0 58* 0 68   CALCIUM mg/dL 8 7 8 7             Results from last 7 days   Lab Units 03/27/22  0617 03/25/22  0642   POC GLUCOSE mg/dl 117 111       Review of Scheduled Meds:  Current Facility-Administered Medications   Medication Dose Route Frequency Provider Last Rate    acetaminophen  975 mg Oral Q8H [facility] Donel Ferraris, DO      bisacodyl  10 mg Rectal Daily PRN Donel Ferraris, DO      calcium carbonate  500 mg Oral Daily PRN Donel Ferraris, DO      Diclofenac Sodium  2 g Topical TID Donel Ferraris, DO      docusate sodium  100 mg Oral BID Donel Ferraris, DO      docusate sodium  100 mg Oral Once Donel Ferraris, DO      enoxaparin  30 mg Subcutaneous Q12H [facility] Donel Ferraris, DO      lactulose  20 g Oral Daily PRN Donel Ferraris, DO      levothyroxine  150 mcg Oral Early Morning Donel Ferraris, DO      loratadine-pseudoephedrine  1 tablet Oral Daily Donel Ferraris, DO      magnesium citrate  148 mL Oral Once Donel Ferraris, DO      methocarbamol  750 mg Oral Q6H [facility] Done Ferraris, DO      morphine  105 mg Oral Q12H [facility] Donel Ferraris, DO      nicotine  21 mg Transdermal Daily Donel Ferraris, DO      oxyCODONE  10 mg Oral Q4H PRN Donel Ferraris, DO      oxyCODONE  10 mg Oral Once Donel Ferraris, DO      oxyCODONE  15 mg Oral Q4H PRN Donel Ferraris, DO      pantoprazole  40 mg Oral Early Morning Donel Ferraris, DO      polyethylene glycol  17 g Oral Daily Donel Ferraris, DO      polyethylene glycol  17 g Oral Once Donel Ferraris, DO      senna  2 tablet Oral Daily Donel Ferraris, DO      senna  1 tablet Oral HS Donel Ferraris, DO         Subjective/ HPI: Patient seen and examined  Patients overnight issues or events were reviewed with nursing or staff during rounds or morning huddle session  New or overnight issues include the following:     No new or overnight issues    Offers no complaints    ROS:   A 10 point ROS was performed; negative except as noted above  Imaging:     No orders to display       *Labs /Radiology studies reviewed  *Medications reviewed and reconciled as needed  *Please refer to order section for additional ordered labs studies  *Case discussed with primary attending during morning huddle case rounds    Physical Examination:  Vitals:   Vitals:    03/27/22 1628 03/27/22 2112 03/27/22 2156 03/28/22 0533   BP: 132/70 146/70 123/55 136/59   BP Location: Right arm Right arm Right arm Right arm   Pulse: 85 86 84 78   Resp: 17 18 18 18   Temp: 98 2 °F (36 8 °C) 98 4 °F (36 9 °C) 98 3 °F (36 8 °C) 98 3 °F (36 8 °C)   TempSrc: Oral Oral Oral Oral   SpO2: 90% 91% 93% 93%   Weight:       Height:           General Appearance: no distress, conversive  HEENT: PERRLA, conjuctiva normal; oropharynx clear; mucous membranes moist   Neck:  Supple, normal ROM, no JVD  Lungs: CTA, normal respiratory effort, no retractions, expiratory effort normal  CV: regular rate and rhythm; no rubs/murmurs/gallops, PMI normal   ABD: soft; ND/NT; +BS  EXT: mild edema right ankle and foot  Skin: normal turgor, normal texture, no rashes  Psych: affect normal, mood normal  Neuro: AAO      The above physical exam was reviewed and updated as determined by my evaluation of the patient on 3/28/2022  Invasive Devices  Report    None                    VTE Pharmacologic Prophylaxis: Enoxaparin  Code Status: Level 1 - Full Code  Current Length of Stay: 6 day(s)      Total time spent:  30 minutes with more than 50% spent counseling/coordinating care  Counseling includes discussion with patient re: progress  and discussion with patient of his/her current medical state/information  Coordination of patient's care was performed in conjunction with primary service  Time invested included review of patient's labs, vitals, and management of their comorbidities with continued monitoring   In addition, this patient was discussed with medical team including physician and advanced extenders  The care of the patient was extensively discussed and appropriate treatment plan was formulated unique for this patient  ** Please Note:  voice to text software may have been used in the creation of this document   Although proof errors in transcription or interpretation are a potential of such software**

## 2022-03-28 NOTE — TELEPHONE ENCOUNTER
I would like a call from Dr Moiz nagy about my father  If he could call me at [phone] I would appreciated

## 2022-03-28 NOTE — CASE MANAGEMENT
Cm received two messages from pts ex wife honey, one Saturday a m  at 11 30, the other today at 7 30am   Cm returned call and explained cm 's work hours and answered questions re: potential dc today  Per Honey they "are not medical people" and pt is "making bad decisions"  Cm explained family had been contacted for training with therapy to see how pt is performing but didn't get a return call  Cm explained Honey had not been provided as a contact to speak with  Honey didn't understand as she had been the contact when pt was on the 6th floor  Cm explained pt has been dc'd from the hospital and is in rehab, it is a different admissions  Honey didn't understand why CM couldn't just look at pts hospital record to see who to speak with  CM explained pt is alert and oriented and able to make his own decisions, even bad ones  Honey stated her son was onsite now to speak with nursing and cm about pts dc today, that they are not ready to have pt return home Honey inquired about dme and cm reviewed the order from therapy and the plan for delivery today  Cm also stated pt would not be home until later today due to pts request for transport  CM asked to phone Cloteal Blew back after the morning meeting when the team got together to review events from the weekend  After morning meeting cm, dr Briseida Mckenna and Inna Gamez, OT met with pts son Bill Romero  He asked for dc tomorrow despite seeing his father perform with therapy this morning and confirmed he is better than the last time they saw him  CM relayed the dc plan today was not set by the team that this was pts request  And if the family is asking for the pt to dc tomorrow, they need to tell the patient  Son in agreement and met w/pt  Cm confirmed dme order and delivery this afternoon  Cm to follow up with [facility] arrangements  Son met w/pt and the outcome is the dc will occur tomorrow  Request made to [facility] for a w/c [facility] transport for tomorrow       Referral made to [facility] of [id number] [facility] rn pt and ot services  Cm notified by Hanna Monte that Magalis Anguiano will be picking pt up at 11am on Tuesday to return him to his home  Pt staff and family made aware

## 2022-03-28 NOTE — PLAN OF CARE
Problem: PAIN - ADULT  Goal: Verbalizes/displays adequate comfort level or baseline comfort level  Description: Interventions:  - Encourage patient to monitor pain and request assistance  - Assess pain using appropriate pain scale  - Administer analgesics based on type and severity of pain and evaluate response  - Implement non-pharmacological measures as appropriate and evaluate response  - Consider cultural and social influences on pain and pain management  - Notify physician/advanced practitioner if interventions unsuccessful or patient reports new pain  Outcome: Progressing     Problem: INFECTION - ADULT  Goal: Absence or prevention of progression during hospitalization  Description: INTERVENTIONS:  - Assess and monitor for signs and symptoms of infection  - Monitor lab/diagnostic results  - Monitor all insertion sites, i e  indwelling lines, tubes, and drains  - Monitor endotracheal if appropriate and nasal secretions for changes in amount and color  - [place] appropriate cooling/warming therapies per order  - Administer medications as ordered  - Instruct and encourage patient and family to use good hand hygiene technique  - Identify and instruct in appropriate isolation precautions for identified infection/condition  Outcome: Progressing  Goal: Absence of fever/infection during neutropenic period  Description: INTERVENTIONS:  - Monitor WBC    Outcome: Progressing     Problem: SAFETY ADULT  Goal: Patient will remain free of falls  Description: INTERVENTIONS:  - Educate patient/family on patient safety including physical limitations  - Instruct patient to call for assistance with activity   - Consult OT/PT to assist with strengthening/mobility   - Keep Call bell within reach  - Keep bed low and locked with side rails adjusted as appropriate  - Keep care items and personal belongings within reach  - Initiate and maintain comfort rounds  - Make Fall Risk Sign visible to staff  - Offer Toileting every Hours, in advance of need  - Initiate/Maintainalarm  - Obtain necessary fall risk management equipment:   - Apply yellow socks and bracelet for high fall risk patients  - Consider moving patient to room near nurses station  Outcome: Progressing  Goal: Maintain or return to baseline ADL function  Description: INTERVENTIONS:  -  Assess patient's ability to carry out ADLs; assess patient's baseline for ADL function and identify physical deficits which impact ability to perform ADLs (bathing, care of mouth/teeth, toileting, grooming, dressing, etc )  - Assess/evaluate cause of self-care deficits   - Assess range of motion  - Assess patient's mobility; develop plan if impaired  - Assess patient's need for assistive devices and provide as appropriate  - Encourage maximum independence but intervene and supervise when necessary  - Involve family in performance of ADLs  - Assess for home care needs following discharge   - Consider OT consult to assist with ADL evaluation and planning for discharge  - Provide patient education as appropriate  Outcome: Progressing  Goal: Maintains/Returns to pre admission functional level  Description: INTERVENTIONS:  - Perform BMAT or MOVE assessment daily    - Set and communicate daily mobility goal to care team and patient/family/caregiver  - Collaborate with rehabilitation services on mobility goals if consulted  - Perform Range of Motion times a day  - Reposition patient every hours    - Dangle patient times a day  - Stand patient  times a day  - Ambulate patient times a day  - Out of bed to chair times a day   - Out of bed for meals times a day  - Out of bed for toileting  - Record patient progress and toleration of activity level   Outcome: Progressing     Problem: DISCHARGE PLANNING  Goal: Discharge to home or other facility with appropriate resources  Description: INTERVENTIONS:  - Identify barriers to discharge w/patient and caregiver  - Arrange for needed discharge resources and transportation as appropriate  - Identify discharge learning needs (meds, wound care, etc )  - Arrange for interpretive services to assist at discharge as needed  - Refer to Case Management Department for coordinating discharge planning if the patient needs post-hospital services based on physician/advanced practitioner order or complex needs related to functional status, cognitive ability, or social support system  Outcome: Progressing     Problem: Prexisting or High Potential for Compromised Skin Integrity  Goal: Skin integrity is maintained or improved  Description: INTERVENTIONS:  - Identify patients at risk for skin breakdown  - Assess and monitor skin integrity  - Assess and monitor nutrition and hydration status  - Monitor labs   - Assess for incontinence   - Turn and reposition patient  - Assist with mobility/ambulation  - Relieve pressure over bony prominences  - Avoid friction and shearing  - Provide appropriate hygiene as needed including keeping skin clean and dry  - Evaluate need for skin moisturizer/barrier cream  - Collaborate with interdisciplinary team   - Patient/family teaching  - Consider wound care consult   Outcome: Progressing     Problem: Potential for Falls  Goal: Patient will remain free of falls  Description: INTERVENTIONS:  - Educate patient/family on patient safety including physical limitations  - Instruct patient to call for assistance with activity   - Consult OT/PT to assist with strengthening/mobility   - Keep Call bell within reach  - Keep bed low and locked with side rails adjusted as appropriate  - Keep care items and personal belongings within reach  - Initiate and maintain comfort rounds  - Make Fall Risk Sign visible to staff  - Offer Toileting every Hours, in advance of need  - Initiate/Maintain alarm  - Obtain necessary fall risk management equipment:   - Apply yellow socks and bracelet for high fall risk patients  - Consider moving patient to room near nurses station  Outcome: Progressing

## 2022-03-28 NOTE — PROGRESS NOTES
Occupational Therapy Treatment Note         03/28/22 0835   Restrictions/Precautions   Precautions Bed/chair alarms;Cognitive; Fall Risk;Supervision on toilet/commode;Pain   LLE Weight Bearing Per Order NWB   Braces or Orthoses LE Immobilizer  (L LE )   Lifestyle   Autonomy "okay, I can show him how I do it"    Eating   Type of Assistance Needed Independent   Physical Assistance Level No physical assistance   Eating CARE Score 6   Upper Body Dressing   Comment OT demonstrated to pt's son how to don/doff knee immobilizer while L LE supported in bed  son is able to adjust straps    Roll Left and Right   Type of Assistance Needed Set-up / [employer] [place]; Adaptive equipment   Physical Assistance Level No physical assistance   Comment +bed rails    Roll Left and Right CARE Score 5   Sit to Lying   Type of Assistance Needed Physical assistance   Physical Assistance Level 25% or less   Comment assist L LE using bed rails    Sit to Lying CARE Score 3   Lying to Sitting on Side of Bed   Type of Assistance Needed Set-up / clean-up; Adaptive equipment   Physical Assistance Level No physical assistance   Comment +bed rails and HOB elevated    Lying to Sitting on Side of Bed CARE Score 5   Sit to Stand   Reason if not Attempted Safety concerns   Sit to Stand CARE Score 88   Bed-Chair Transfer   Type of Assistance Needed Supervision; Adaptive equipment   Physical Assistance Level No physical assistance   Comment sliding board transfers    Chair/Bed-to-Chair Transfer CARE Score 4   Toileting Hygiene   Type of Assistance Needed Supervision; Adaptive equipment   Physical Assistance Level No physical assistance   Comment pt will sit with clothing management for shorts and sit for hygiene  Toileting Hygiene CARE Score 4   Toilet Transfer   Type of Assistance Needed Supervision; Adaptive equipment   Physical Assistance Level No physical assistance   Comment pt's son provides supervision and educated on how to stabilize [facility] transfer during sliding board to/from bed and drop arm BSC  stool under L LE once seated on [facility]    Toilet Transfer CARE Score 4   Cognition   Overall Cognitive Status Impaired   Arousal/Participation Alert; Cooperative   Attention Attends with cues to redirect   Orientation Level Oriented to person;Oriented to place;Oriented to time;Oriented to situation   Memory Decreased short term memory   Following Commands Follows one step commands without difficulty   Activity Tolerance   Activity Tolerance Patient tolerated treatment well   Assessment   Treatment Assessment Pt's son present, discusses concerns with pt's d/c home (which pt wants to occur today)  Concern is pt's family has not seen pt mobilize recently, as well as they want to ensure appropriate equipment is in place for pt  OT completed family training with pt's son, demonstrated to son how pt completes bed mobility; son provided supervision with sliding board transfer to drop arm BSC, OT explained w/c arm rests/leg rests and brake locks as well as how to position w/c and BSC for sliding board transfers; explained how to assist pt at bed level for LB ADLs and setup assist for UB ADLs  Educated son on how to empty BSC using BSC liners  Pt is able to verbally instruct pt's son on setup of equipment for sliding board as well  OT provided 2 urinals for home as well as basin and blue pads for bed  Pt's son feels comfortable providing this level of care for pt and has no questions on this, but feels as though Tuesday d/c would be more appropriate than today to allow all equipment to be delivered to pt's home and home setup  Pt's son spoke with pt, and pt is in agreement with this  OT spoke with ROSHNI Sullivan, plan for d/c tomorrow using w/c transport [facility] [place] with DME of hospital bed, drop arm BSC, sliding board and w/c  Recommend home therapy and nursing for at home   OT spoke with YVONNE Stanley earlier, OT did not complete incisional care as per RN not ordered, RN will follow up on incisional care for home  Prognosis Fair   Problem List Decreased strength;Decreased endurance; Impaired balance;Decreased mobility;Orthopedic restrictions;Decreased skin integrity   Plan   Treatment/Interventions ADL retraining;Functional transfer training; Therapeutic exercise; Endurance training;Cognitive reorientation;Patient/family training;Equipment eval/education; Bed mobility; Compensatory technique education   Progress Progressing toward goals   Recommendation   OT Discharge Recommendation Home with home health rehabilitation   OT Therapy Minutes   OT Time In 0835   OT Time Out 0935   OT Total Time (minutes) 60   OT Mode of treatment - Individual (minutes) 20   OT Mode of treatment - Concurrent (minutes) 40   OT Mode of treatment - Group (minutes) 0   OT Mode of treatment - Co-treat (minutes) 0   OT Mode of Treatment - Total time(minutes) 60 minutes   OT Cumulative Minutes 390   Therapy Time missed   Time missed?  No
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
## 2022-03-29 VITALS
BODY MASS INDEX: 33.37 KG/M2 | DIASTOLIC BLOOD PRESSURE: 67 MMHG | RESPIRATION RATE: 18 BRPM | HEART RATE: 76 BPM | OXYGEN SATURATION: 95 % | SYSTOLIC BLOOD PRESSURE: 118 MMHG | WEIGHT: 225.31 LBS | HEIGHT: 69 IN | TEMPERATURE: 98 F

## 2022-03-29 PROCEDURE — NC001 PR NO CHARGE: Performed by: ORTHOPAEDIC SURGERY

## 2022-03-29 PROCEDURE — 99239 HOSP IP/OBS DSCHRG MGMT >30: CPT | Performed by: STUDENT IN AN ORGANIZED HEALTH CARE EDUCATION/TRAINING PROGRAM

## 2022-03-29 RX ORDER — METHOCARBAMOL 750 MG/1
750 TABLET, FILM COATED ORAL EVERY 6 HOURS PRN
Qty: 120 TABLET | Refills: 0 | Status: SHIPPED | OUTPATIENT
Start: 2022-03-29

## 2022-03-29 RX ORDER — OXYCODONE AND ACETAMINOPHEN 10; 325 MG/1; MG/1
1 TABLET ORAL EVERY 6 HOURS PRN
Qty: 20 TABLET | Refills: 0 | Status: SHIPPED | OUTPATIENT
Start: 2022-03-29 | End: 2022-04-04

## 2022-03-29 RX ORDER — ACETAMINOPHEN 325 MG/1
650 TABLET ORAL EVERY 6 HOURS PRN
Refills: 0
Start: 2022-03-29

## 2022-03-29 RX ORDER — NICOTINE 21 MG/24HR
1 PATCH, TRANSDERMAL 24 HOURS TRANSDERMAL DAILY
Qty: 28 PATCH | Refills: 0
Start: 2022-03-29

## 2022-03-29 RX ORDER — LORATADINE AND PSEUDOEPHEDRINE 10; 240 MG/1; MG/1
1 TABLET, EXTENDED RELEASE ORAL DAILY
Refills: 0
Start: 2022-03-29

## 2022-03-29 RX ORDER — FAMOTIDINE 40 MG/1
20 TABLET, FILM COATED ORAL DAILY
Refills: 0
Start: 2022-03-29

## 2022-03-29 RX ADMIN — LORATADINE AND PSEUDOEPHEDRINE 1 TABLET: 10; 240 TABLET, EXTENDED RELEASE ORAL at 08:11

## 2022-03-29 RX ADMIN — PANTOPRAZOLE SODIUM 40 MG: 40 TABLET, DELAYED RELEASE ORAL at 05:56

## 2022-03-29 RX ADMIN — MORPHINE SULFATE 105 MG: 15 TABLET, FILM COATED, EXTENDED RELEASE ORAL at 08:08

## 2022-03-29 RX ADMIN — ACETAMINOPHEN 975 MG: 325 TABLET ORAL at 05:56

## 2022-03-29 RX ADMIN — LEVOTHYROXINE SODIUM 150 MCG: 75 TABLET ORAL at 05:56

## 2022-03-29 RX ADMIN — METHOCARBAMOL TABLETS 750 MG: 750 TABLET, COATED ORAL at 05:56

## 2022-03-29 NOTE — CONSULTS
Orthopedics   Katie Burn 64 y o  male MRN: [id number]  Unit/Bed#: [facility] 396-02      Chief Complaint:   S/p left distal femur ORIF     HPI:   64 y o male status post left distal femur ORIF  He is currently admitted to [facility] rehab where he is progressing well with physical therapy and being discharged to home today  He has been taking Lovenox for DVT prophylaxis however she would like to switch to an oral regimen upon discharge  His pain has significantly improved since surgery  He has been compliant with nonweightbearing status in KI to the left lower extremity  Pain is dull in character, Located above the knee, insidious in onset, intermittent in duration, mild in intensity, Exacerbating factors ambulating, Remitting factors  rest, has radiating pain down the back of his leg, no numbness or tingling, no open wounds noted      Review Of Systems:   · Skin: Normal  · Neuro: See HPI  · Musculoskeletal: See HPI  · 14 point review of systems negative except as stated above     Past Medical History:   Past Medical History:   Diagnosis Date    Chronic pain     Obesity        Past Surgical History:   Past Surgical History:   Procedure Laterality Date    ORIF FEMUR FRACTURE Left 3/16/2022    Procedure: OPEN REDUCTION W/ INTERNAL FIXATION (ORIF) DISTAL FEMUR;  Surgeon: Elyssa Floyd MD;  Location: [facility];  Service: Orthopedics    SPINE SURGERY         Family History:  Family history reviewed and non-contributory  History reviewed  No pertinent family history      Social History:  Social History     Socioeconomic History    Marital status:      Spouse name: None    Number of children: None    Years of education: None    Highest education level: None   Occupational History    None   Tobacco Use    Smoking status: Current Every Day Smoker     Packs/day: 2 00     Types: Cigarettes    Smokeless tobacco: Current User   Vaping Use    Vaping Use: Never used   Substance and Sexual Activity    Alcohol use: Not Currently    Drug use: Never    Sexual activity: None   Other Topics Concern    None   Social History Narrative    None     Social Determinants of Health     Financial Resource Strain: Not on file   Food Insecurity: No Food Insecurity    Worried About Running Out of Food in the Last Year: Never true    Regino of Food in the Last Year: Never true   Transportation Needs: No Transportation Needs    Lack of Transportation (Medical): No    Lack of Transportation (Non-Medical):  No   Physical Activity: Not on file   Stress: Not on file   Social Connections: Not on file   Intimate Partner Violence: Not on file   Housing Stability: Low Risk     Unable to Pay for Housing in the Last Year: No    Number of Places Lived in the Last Year: 1    Unstable Housing in the Last Year: No       Allergies:   No Known Allergies        Labs:  0   Lab Value Date/Time    HCT 29 4 (L) 03/28/2022 0525    HCT 27 8 (L) 03/24/2022 0543    HCT 27 2 (L) 03/21/2022 0455    HGB 8 9 (L) 03/28/2022 0525    HGB 9 0 (L) 03/24/2022 0543    HGB 8 8 (L) 03/21/2022 0455    INR 1 03 03/15/2022 1209    WBC 11 95 (H) 03/28/2022 0525    WBC 11 98 (H) 03/24/2022 0543    WBC 12 93 (H) 03/21/2022 0455       Meds:    Current Facility-Administered Medications:     acetaminophen (TYLENOL) tablet 975 mg, 975 mg, Oral, Q8H [facility], Alaina Katz, DO, 975 mg at 03/29/22 0556    bisacodyl (DULCOLAX) rectal suppository 10 mg, 10 mg, Rectal, Daily PRN, Alaina Aurora, DO    calcium carbonate (TUMS) chewable tablet 500 mg, 500 mg, Oral, Daily PRN, Alaina Sterling, DO    Diclofenac Sodium (VOLTAREN) 1 % topical gel 2 g, 2 g, Topical, TID, Alaina Stelring, DO, 2 g at 03/25/22 2121    docusate sodium (COLACE) capsule 100 mg, 100 mg, Oral, BID, Alaina Sterling, DO    docusate sodium (COLACE) capsule 100 mg, 100 mg, Oral, Once, Alaina Katz DO    enoxaparin (LOVENOX) subcutaneous injection 30 mg, 30 mg, Subcutaneous, Q12H [facility], Alaina Katz DO, 30 mg at 03/24/22 2058    lactulose oral solution 20 g, 20 g, Oral, Daily PRN, [facility], 20 g at 03/26/22 2147    levothyroxine tablet 150 mcg, 150 mcg, Oral, Early Morning, [facility], 150 mcg at 03/29/22 0556    loratadine-pseudoephedrine (CLARITIN-D 24-HOUR)  mg per 24 hr tablet 1 tablet, 1 tablet, Oral, Daily, [facility], 1 tablet at 03/29/22 4767    magnesium citrate (CITROMA) oral solution 148 mL, 148 mL, Oral, Once, [facility]    methocarbamol (ROBAXIN) tablet 750 mg, 750 mg, Oral, Q6H [facility], [facility], 750 mg at 03/29/22 0556    morphine (MS CONTIN) ER tablet 105 mg, 105 mg, Oral, Q12H [facility], [facility], 105 mg at 03/29/22 0808    nicotine (NICODERM CQ) 21 mg/24 hr TD 24 hr patch 21 mg, 21 mg, Transdermal, Daily, [facility], 21 mg at 03/28/22 2037    oxyCODONE (ROXICODONE) immediate release tablet 10 mg, 10 mg, Oral, Q4H PRN, [facility], 10 mg at 03/26/22 0620    oxyCODONE (ROXICODONE) immediate release tablet 10 mg, 10 mg, Oral, Once, [facility]    oxyCODONE (ROXICODONE) IR tablet 15 mg, 15 mg, Oral, Q4H PRN, [facility], 15 mg at 03/28/22 1804    pantoprazole (PROTONIX) EC tablet 40 mg, 40 mg, Oral, Early Morning, [facility], 40 mg at 03/29/22 0556    polyethylene glycol (MIRALAX) packet 17 g, 17 g, Oral, Daily, [facility], 17 g at 03/26/22 0818    polyethylene glycol (MIRALAX) packet 17 g, 17 g, Oral, Once, [facility], DO    senna (SENOKOT) tablet 17 2 mg, 2 tablet, Oral, Daily, [facility], DO, 17 2 mg at 03/26/22 0818    senna (SENOKOT) tablet 8 6 mg, 1 tablet, Oral, HS, [facility], DO, 8 6 mg at 03/27/22 2158    Blood Culture:   No results found for: BLOODCX    Wound Culture:   No results found for: WOUNDCULT    Ins and Outs:  I/O last 24 hours:   In: 400 [P O :400]  Out: 700 [Urine:700]          Physical Exam:   /67 (BP Location: Right arm)   Pulse 76   Temp 98 °F (36 7 °C) (Oral)   Resp 18 Ht 5' 9" (1 753 m)   Wt 102 kg (225 lb 5 oz)   SpO2 95%   BMI 33 27 kg/m²   Gen: Alert and oriented to person, place, time  HEENT: EOMI, eyes clear, moist mucus membranes, hearing intact  Respiratory: Bilateral chest rise  No audible wheezing found  Cardiovascular: Regular Rate and Rhythm  Abdomen: soft nontender/nondistended  Musculoskeletal: left lower extremity  · Staples intact, no surrounding erythema or discharge  · Appropriately Tender to palpation over superior knee  · Mild effusion  · Can perform straight leg raise  · Sensation intact L3-S1  · 5/5 strength to hip flexion/extension, knee flexion/extension, ankle dorsi/plantar flexion, EHL/FHL  · 2+ DP pulse    Radiology:   I personally reviewed the films  X-rays left knee shows comminuted intra-articular distal femur fracture with a long lateral plate, hardware in good position without any and evidence of loosening      Assessment:  64 y o  male pod 13 from left distal femur ORIF  Staples removed today  Doing well  Plan:   · Nonweightbearing to left lower extremity in hinged knee brace, unlocked  · PT outpt: may being gentle ROM exercises  · Pain control  · DVT ppx with lovenox for 4 more weeks  · Body mass index is 33 27 kg/m²  mildly obese  Recommend behavior modifications, nutrition and physical activity    · Dispo: Janice Mujica for discharge from ortho perspective, follow-up with Dr Samir Boston in 4 weeks    Barbara Rodriguez MD

## 2022-03-29 NOTE — NURSING NOTE
AVS reviewed with patient and placed in pt's zippered backpack  Pt left in wheelchair ordered by [employer]  Smoking cessation teaching provided  Pt left floor with knee immobilizer, soft splints and personal items including his laptop

## 2022-03-29 NOTE — PLAN OF CARE
Problem: PAIN - ADULT  Goal: Verbalizes/displays adequate comfort level or baseline comfort level  Description: Interventions:  - Encourage patient to monitor pain and request assistance  - Assess pain using appropriate pain scale  - Administer analgesics based on type and severity of pain and evaluate response  - Implement non-pharmacological measures as appropriate and evaluate response  - Consider cultural and social influences on pain and pain management  - Notify physician/advanced practitioner if interventions unsuccessful or patient reports new pain  Outcome: Progressing     Problem: INFECTION - ADULT  Goal: Absence or prevention of progression during hospitalization  Description: INTERVENTIONS:  - Assess and monitor for signs and symptoms of infection  - Monitor lab/diagnostic results  - Monitor all insertion sites, i e  indwelling lines, tubes, and drains  - Monitor endotracheal if appropriate and nasal secretions for changes in amount and color  - [place] appropriate cooling/warming therapies per order  - Administer medications as ordered  - Instruct and encourage patient and family to use good hand hygiene technique  - Identify and instruct in appropriate isolation precautions for identified infection/condition  Outcome: Progressing  Goal: Absence of fever/infection during neutropenic period  Description: INTERVENTIONS:  - Monitor WBC    Outcome: Progressing     Problem: SAFETY ADULT  Goal: Patient will remain free of falls  Description: INTERVENTIONS:  - Educate patient/family on patient safety including physical limitations  - Instruct patient to call for assistance with activity   - Consult OT/PT to assist with strengthening/mobility   - Keep Call bell within reach  - Keep bed low and locked with side rails adjusted as appropriate  - Keep care items and personal belongings within reach  - Initiate and maintain comfort rounds  - Make Fall Risk Sign visible to staff  - Offer Toileting every 2 Hours, in advance of need  - Initiate/Maintain bed/chair alarm  - Obtain necessary fall risk management equipment: nonskid socks  - Apply yellow socks and bracelet for high fall risk patients  - Consider moving patient to room near nurses station  Outcome: Progressing  Goal: Maintain or return to baseline ADL function  Description: INTERVENTIONS:  -  Assess patient's ability to carry out ADLs; assess patient's baseline for ADL function and identify physical deficits which impact ability to perform ADLs (bathing, care of mouth/teeth, toileting, grooming, dressing, etc )  - Assess/evaluate cause of self-care deficits   - Assess range of motion  - Assess patient's mobility; develop plan if impaired  - Assess patient's need for assistive devices and provide as appropriate  - Encourage maximum independence but intervene and supervise when necessary  - Involve family in performance of ADLs  - Assess for home care needs following discharge   - Consider OT consult to assist with ADL evaluation and planning for discharge  - Provide patient education as appropriate  Outcome: Progressing  Goal: Maintains/Returns to pre admission functional level  Description: INTERVENTIONS:  - Perform BMAT or MOVE assessment daily    - Set and communicate daily mobility goal to care team and patient/family/caregiver  - Collaborate with rehabilitation services on mobility goals if consulted  - Perform Range of Motion 3 times a day  - Reposition patient every 2 hours    - Dangle patient 3 times a day  - Stand patient 3 times a day  - Ambulate patient 3 times a day  - Out of bed to chair 3 times a day   - Out of bed for meals 3 times a day  - Out of bed for toileting  - Record patient progress and toleration of activity level   Outcome: Progressing     Problem: DISCHARGE PLANNING  Goal: Discharge to home or other facility with appropriate resources  Description: INTERVENTIONS:  - Identify barriers to discharge w/patient and caregiver  - Arrange for needed discharge resources and transportation as appropriate  - Identify discharge learning needs (meds, wound care, etc )  - Arrange for interpretive services to assist at discharge as needed  - Refer to Case Management Department for coordinating discharge planning if the patient needs post-hospital services based on physician/advanced practitioner order or complex needs related to functional status, cognitive ability, or social support system  Outcome: Progressing     Problem: Prexisting or High Potential for Compromised Skin Integrity  Goal: Skin integrity is maintained or improved  Description: INTERVENTIONS:  - Identify patients at risk for skin breakdown  - Assess and monitor skin integrity  - Assess and monitor nutrition and hydration status  - Monitor labs   - Assess for incontinence   - Turn and reposition patient  - Assist with mobility/ambulation  - Relieve pressure over bony prominences  - Avoid friction and shearing  - Provide appropriate hygiene as needed including keeping skin clean and dry  - Evaluate need for skin moisturizer/barrier cream  - Collaborate with interdisciplinary team   - Patient/family teaching  - Consider wound care consult   Outcome: Progressing     Problem: Potential for Falls  Goal: Patient will remain free of falls  Description: INTERVENTIONS:  - Educate patient/family on patient safety including physical limitations  - Instruct patient to call for assistance with activity   - Consult OT/PT to assist with strengthening/mobility   - Keep Call bell within reach  - Keep bed low and locked with side rails adjusted as appropriate  - Keep care items and personal belongings within reach  - Initiate and maintain comfort rounds  - Make Fall Risk Sign visible to staff  - Offer Toileting every 2 Hours, in advance of need  - Initiate/Maintain bed/chair alarm  - Obtain necessary fall risk management equipment: nonskid socks  - Apply yellow socks and bracelet for high fall risk patients  - Consider moving patient to room near nurses station  Outcome: Progressing

## 2022-03-29 NOTE — CASE MANAGEMENT
Received notification via ecin from [facility] of [facility] that pts PCP is refusing to sign homecare orders as the patient has not been seen in the last 3 months  [facility] [address] [place] [address] is not accepting the case until they have an accepting doctor to sign orders  Cm phoned pts son Bill Romero and asked him and his father to call the pcp office to make an appmt asap and to notify cm of when appmt is scheduled  Dr Briseida Mckenna made aware

## 2022-03-29 NOTE — RESTORATIVE TECHNICIAN NOTE
Restorative Technician Note      Patient Name: Williams Stuart     Restorative Tech Visit Date: 03/29/22  Note Type: Bracing, Initial consult  Patient Position Upon Consult: Supine  Brace Applied: Long Hinged Knee Brace (L LE; unlocked per orders)  Additional Brace Ordered: No  Patient Position When Brace Applied: Supine  Education Provided: Yes  Patient Position at End of Consult: Supine  Nurse Communication: Nurse aware of consult, application of brace        Knee immobilizer removed and hinged knee brace donned to the patient's left leg  Educated patient on the brace  Pt states he has daughters at home that can assist as needed       Please call the ortho tech, ext [phone], with any bracing questions and/or adjustments     CFo Mariah
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
## 2022-03-29 NOTE — DISCHARGE INSTRUCTIONS
DISCHARGE INSTRUCTIONS: Fabiola Tellez 65 22    Bring these instructions with you to your Outpatient Physician appointments so they can order and follow-up any additional lab work or imaging recommended at time of discharge  It  is you or your caregivers responsibility to obtain follow-up MEDICATION REFILLS  As indicated through your Primary Care Physician (PCP) and other outpatient specialty provider(s) after discharge  Please follow-up with your PCP as soon as possible after discharge to set-up follow-up management and when appropriate refills  You remain a fall and injury risk which could be severe  - Your risk of fall has decreased however since admission to acute rehab  Caregiver training has been completed with our staff  - Appropriate supervision +/- assistance as instructed during your rehab course is recommended to decrease risk of fall and injury  - Continue skilled therapy as discussed after discharge to further decrease this risk    If you (or your health care proxy) have any questions or concerns regarding your acute rehabilitation stay including issues with medications, rehabilitation, and follow-up plan, please call:          [address] [place] [address] in [facility] at [phone] or [phone]  Should you develop fevers, chills, new weakness, changes in sensation, difficulty speaking, facial weakness, confusion, shortness of breath, chest pain, or other concerning symptoms please call 911 and/or obtain transportation to nearest ER immediately  Should you develop worsening pain, swelling, or drainage notify your surgeon right away or obtain transportation to nearest ER for evaluation  Should you develop feelings of significant hopelessness, severe depression, severe anxiety, or suicide you should call 911 or obtain transportation to nearest ER      National Suicide Prevention Lifeline is 9-561.490.3913 and is available 24 hrs/7 days a week  [facility] [phone] is available 24 hrs/7 days for mental health  [facility] ([facility]) AT [place] [phone] is available 24 hrs/7 days for mental health   [address] [phone]    PHYSICIANS to see:  Please see your doctors listed in the follow up providers section of your discharge paperwork, and take the discharge paperwork with you to your appointments  Home health has been ordered for you: Your home health agency should reach out to you or your family soon to arrange follow-up  (If [facility] is your provider their phone number is [phone])    If you are unable to get in touch with home health you may contact your  at [phone]  [place]      LAB WORK recommended after discharge: Follow-up lab work at discretion of your outpatient physicians to be determined at time of your future appointments  IMAGING to follow-up:  Follow-up imaging as discussed with your recent physicians or at discretion of your outpatient physicians to be determined at time of your appointments  SKIN CARE INSTRUCTIONS to follow:  Monitor incision(s) for increased redness, swelling, pain/tenderness, discharge/pus and promptly notify your surgeon should these develop  - Should you develop significant pain, swelling, or drainage obtain transportation to nearest emergency room for immediate evaluation if unable to reach your surgeon promptly   - Should you develop uncontrolled pain, fever, chills, sweats, changes in strength, sensation, or color of this area obtain transportation to nearest emergency room for immediate evaluation  Monitor skin for increased redness or breadown and promptly notify your physician should these develop    Be sure you stand and walk some every 1-2 hours during day  While seated or lying in bed shift positions and from side to side often    Can use barrier type cream such as Hydragaurd 1-2 times per day     Turn patient (yourself) every 2 hours  WOUND/Incision CARE INSTRUCTIONS to follow:  Home health nursing will assist you with Incision and wound management  You may contact them with issues as well once this service is set-up  If [facility] is your provider their phone number is [phone]  If you are unable to get in touch with home health you may contact your  at [phone]  Due to the following you are at increased risk of skin breakdown/wounds/worsening wounds:  - Impaired mobility  - Impaired nutrition/intake/low albumin  - Medical co-morbidities    Buttocks/Sacrum  - Turn as full as possible off sacrum/buttocks every 2 hours  - Use Cushion while in chair or wheelchair  - Weight shift every 10-15 minutes while in chair  - Keep skin clean and dry as possible  Remove wet or soiled clothing/linens promptly  - Use barrier cream or similar 2 times per day   - Monitor skin for increased breakdown which you are at risk of and notify nursing, PCP, or other physician providers should this occur right away      Keep incision site clean and covered with ABD, ACE, or Kerlix while using the hinged knee brace  Local care to scraps and other wounds, monitor for signs of infection  WEIGHTBEARING/ACTIVITY PRECAUTIONS to follow:  Weightbearing as tolerated  Maintain NONWEIGHTBEARING in left leg/lower extremity    Driving restrictions: You are recommended against driving until cleared by an outpatient physician  Work restrictions: You should NOT return to work (if working) until cleared by an outpatient physician  You should not operate heavy machinery (if applicable) until cleared by an outpatient physician  Alcohol restrictions: You are recommended to not drink alcohol at this time unless cleared by an outpatient physician  Drinking alcohol in your current functional condition can increase your risk of injury which could be severe    Drinking alcohol given your current health problems can lead to increased medical complications which could be severe  Combining alcohol with your current medications can increase your risk of injury which could be severe  Smoking restrictions: You are recommended to not smoke nicotine  Smoking increases your risk of heart attack, stroke, emphysema/COPD, and lung cancer  MEDICATIONS:  Please see a full list of your medications outlined in the After Visit Summary that is attached to these Discharge Instructions  Please note changes may have been made to your medications please refer to your discharge paperwork for your current medications and take this list with you to all your doctors appointments for your doctors to review  Please do not resume a home medication unless the medication reconciliation sheet indicates to do so, please do not assume that a medication that you were given a prescription for is the same as a medication you have at home based on both medications having the same name as dosages and frequency may have changed  Unless specifically noted in your medication list provided to you in your discharge paper work do not resume prior vitamins, minerals, or supplements you may have been taking prior to your hospitalization unless instructed by an outpatient physician in the future  Discuss with your primary care at next visit if applicable  Blood thinners prescribed to optimize long and short term health and decrease risk of complications but with risks related to bleeding and other complications  Lovenox (Enoxaparin) Instructions: You have been prescribed Lovenox (Enoxaparin)  This medication is used to prevent clots which can cause severe disability and even death    This is a strong anticoagulant (blood thinner)  It is being recommended for use by you (or your family) to decrease the risk of clotting which can be severely disabling and even life-threatening    Even when provided as recommended it can cause severe disabling and even life-threatening bleeding  Too much or too little of this blood thinner further increases your risk of this medication causing serious and even life-threatening complications such as severe bleeding, clots, strokes, and death  With that said, at this time based on best available evidence and consensus agreement, your physicians recommend you take Lovenox (Enoxaparin) medication based on your overall risks and benefits in your specific medical situation  If you (or your family/caregiver) notice black stools, bloody stools, vomit blood, develop new weakness, slurred speech, confusion or have any other concerning symptoms call 911 or obtain transportation to nearest emergency room immediately  ***Please complete the remainder of your course of lovenox (enoxaparin) as prescribed  Sedating Medications with increased risk of complications:    Morphine ER (opiate) pain medication has been used to help your acute pain  You tolerated this medication adequately during your recent hospital stay  Oxycodone (opiate) pain medication has been used to help your acute pain  You tolerated this medication adequately during your recent hospital stay  You will be given a limited supply of opiate pain medications on discharge; should you require additional refills/medications, your PCP and/or surgeon may prescribe at his/her discretion  This can be quite helpful particularly for severe acute pain  They can increase your risk of falling, injury, and breathing difficulties which can be severe and even life-threatening  Note it is advisable to limit use of opiate pain medications as they can become habit forming and lead to addiction  You have been continued on chronic opiate pain medications on discharge; should you require additional refills/medications, your PCP and/or surgeon/pain provider may prescribe at his/her discretion     They can be quite helpful particularly for severe acute pain and at times chronic pain  They can increase your risk of falling and injury which can be severe and even life-threatening  Note it is advisable to limit use of pain medications for these reasons as well as they can become habit forming and lead to addiction  Methocarbamol (Robaxin) pain/muscle spasm medication has been used to help your acute pain and spasms  You tolerated this medication adequately during your recent hospital stay  - Do not take with alcohol (or marijuana/cannabis) while on this medication as this can cause increased confusion, breathing problems, falls, and severe injury  - Follow-up with your primary care physician within 1-2 weeks as well as relevant specialty physician for additional management of your medical conditions, potential refills, or adjustments of this medication         - Follow-up with your primary care physician for additional management of your medical conditions, potential refills, or adjustments of this medication         - If pain not adequately controlled with primary providers after discharge you may request referral or request appointment to outpatient pain management specialist such as:     [facility]   [employer]  [phone] ([id number])      [address] specific to you:    Urinary dysfunction management:  - Follow-up with urology after discharge for intermittent blood in your urine  You had noted this had been worked up over 10 years ago without any found cause, however this should still be pursued  You may return to [address] [place], [place] [address]ey [facility] or the location of a [facility] office in [place], [place] has been provided  Acetaminophen (Tylenol) Dosing Warning: You may have up to 3000 mg of acetaminophen (Tylenol) from all sources spread out over a 24 hours period    Do not have more than that, as this can increase your risk of liver injury which can be serious  Bowel management:  - Ideally you would have 1 well formed BM every 1-2 days  Adjust bowel regimen based on that goal or as close to that as possible  - Start with taking miralax 1-2 times per day and senna twice daily  - If still constipated you can take either oral or by rectum dulcolax (but not at the same time)  - If still constipated after dulcolax than you may temporarily take mag citrate x1 (but should try to avoid this if possible as this can occasionally cause abnormal electrolytes and other complications and notify your physician if you need to take)  - If unable to go for more than 4 days notify your physician for additional recommendations    - Follow-up with your primary care physician at next visit  - If you develop significant abdominal pain, nausea/vomiting, or other concerns seek medical attention right away  NSAID Warning:  Please avoid NSAID (including but not limited to advil, aleve, motrin, naproxen, ibuprofen, mobic, meloxicam, diclofenac etc) medications as NSAID medications may increase your risk of bleeding (which can be life-threatening), stroke, heart attack, developing/worsening GI ulcers, worsening heart failure, kidney disease, liver (cirrhosis) disease, potentially delay bone healing  Please note a summary of your hospital stay with relevant information for your doctors will try to be sent to them  Please confirm with your doctors at your follow up visits that they have received this summary and have them contact [address] [place] [address] if they have not received them along with any other medical records they may require       Ally Downs Phone Number:  [phone]

## 2022-03-30 ENCOUNTER — TELEPHONE (OUTPATIENT)
Dept: OBGYN CLINIC | Facility: HOSPITAL | Age: 62
End: 2022-03-30

## 2022-03-30 NOTE — TELEPHONE ENCOUNTER
Called and Lmom asking pt to please cb in order to be scheduled with Dr Colan Ormond for P/O  Advised we would like to add him on to 4/14 @ 1:30  Asked that he cb

## 2022-03-30 NOTE — CASE MANAGEMENT
Team dc summary - pt made gains and family education was completed  Pt requested to dc home and received a wheelchair, slide board, commode and hospital bed through [facility]  Pt requested w/c Eula Fish transport home  Through [employer] a w/c Eula Fish was arranged with Zachery for 11am  Referral was made to [facility] [address] [place] [address], but they are unable to see pt until he has an appt and is seen by his pcp  Dr Ivana Jha made aware

## 2022-03-31 NOTE — PROGRESS NOTES
03/31/22 [address], [Guardians Name / Serina Schilder, this is [Caller Jarred Garcia from [facility], and our clinical care team wanted to check on you / your child after your recent visit to the hospital  It will only take 3-5 minutes  Is this a good time? Discharge Call Type/ Specific Diagnosis: General Call   Call Complete   Attempted Number of Calls 2   Discharge phone call complete? Left Message  (Messages left 03/30 & 03/31)   Hi, This is ________ from [facility]  This is just a courtesy call, and there is no need to call us back  Have a great day     (Called by [facility])

## 2022-04-04 ENCOUNTER — TELEPHONE (OUTPATIENT)
Dept: OBGYN CLINIC | Facility: HOSPITAL | Age: 62
End: 2022-04-04

## 2022-04-04 NOTE — TELEPHONE ENCOUNTER
Message reviewed,  patient's chart reviewed,  PDMP reviewed    Patient has been taking long-term morphine and oxycodone  Patient recently had Dr Arjun Garcia take care of Him  I will refill oxycodone 1 time the absence of his usual provider  I will not refill the morphine      I chose a pharmacy that the computer showed me he fills these prescriptions at,  [facility] in [place], ( refer to the previous prescriptions)    TUYET

## 2022-04-04 NOTE — TELEPHONE ENCOUNTER
Pt sees Dr Luis Miguel Farmer    Pt daughter is calling to see if the pt can get a prescription for Morphine and Oxycodone   Pt daughter states that they can not get in touch with the pt family  so they were referred to us     #[phone] or [phone]
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
## 2022-04-25 NOTE — TELEPHONE ENCOUNTER
I left a voicemail for this patient to call back  Dr Suni Orozco and Madison Cavazos have agreed to see the patient in the [facility] office  I do not have a surgeon that goes to the [place] office  Please schedule the patient with Madison Cavazos on a day when Dr Suni Orozco is also in the office      Thanks

## 2022-04-25 NOTE — TELEPHONE ENCOUNTER
PO Dr Radha Fitch    Patient states he is currently wheelchair bound and can only get transports to [facility]  Patient asked if a [place] surgeon can see him    Hello,  Please advise if the following patient can be forced onto the schedule:    Patient: Juliann Hurtado    : 1960    MRN: [id number]      Call back #:     Insurance:    Reason for appointment: PO Dr Radha Fitch    Requested doctor/location: Dr Tito Adame; Active global billing       Thank you

## 2022-04-27 NOTE — OCCUPATIONAL THERAPY NOTE
Occupational Therapy Discharge Summary:     Pt made fair progress throughout rehab stay, from OT standpoint  Pt was D/C home with family support and continued home therapy services  At time of D/C pt was completing ADLS with min assist/supervison at bed level and functional transfers with sliding board at min assist level  Pt recommended for the following DME: drop arm BSC, w/c, sliding board    Family training was completed with pt's son      '

## 2022-05-11 NOTE — TELEPHONE ENCOUNTER
Patient ex-wife, isabelle calling for patient, he has appointment 5-16 in [place] with Jennifer Harris  Due to pt is wheelchair bound, unable to bend knee they are having trouble figuring out how to get him to appt  She has tried [employer] (doesn't take insurance), and ambulance to no avail  Asking if we can provide assistance whom they can contact for transportation to appt        isabelle [phone]

## 2022-05-11 NOTE — TELEPHONE ENCOUNTER
This does not seem like a patinet I should be seeing  This should be on Dr Tariq Diss schedule  Also, please help figure out transportation  Thanks

## 2022-05-11 NOTE — TELEPHONE ENCOUNTER
Dr Morris  RE:Transport  CB#: [phone]    Honey called back into the office she was able to get in touch with [facility]  She states they are requesting for insurance purposes to have a document stating that it is medically necessary for the patient to have this transportation to his appointment       Contact info for who wife spoke with     Lam Mcneil : [phone] (In charge of ride coordination)

## 2022-05-11 NOTE — TELEPHONE ENCOUNTER
Autumn Gar per a previous discussion between Elina Hale and Dr Denzil Primrose, it was determined that Dr Denzil Primrose would be taking over care for this patient and to have patient scheduled for his first post op with you on a day Dr Denzil Primrose is in the office    _______________________________________________________________________________________________________________    Hope Perri Méndez to call our office back regarding possible transportation options   In regards to transportation they could contact [facility] at [phone] or Toll-Free at [phone] between the hours of 8:00 a m  and 2:00 p m  Calls must be made at least three (3) working days in advance of the day of the needed trip "  For a wheelchair accessible bus [facility] [address] [facility] just started doing non emergent transportation [phone]    Per Chart Review patient has Medicare AB (if this is correct) if they do not have any steps to get into their house they could also try [employer] transportation BUT they usually only do facility (nursing home, acute rehab etc) to facility (hospital/doctor's office) #[phone]

## 2022-05-11 NOTE — TELEPHONE ENCOUNTER
Patient's wife, Sumit Hemphill is calling to speak with our office about arranging transport for the patient's post op appt on 05/16/2022  Sumit Hemphill states they have not been able to find transport in [place]  Patient is in a wheel chair and is unable to get up into an SUV       Honey was provided with the companies/numbers provided in the previous message

## 2022-05-16 ENCOUNTER — APPOINTMENT (OUTPATIENT)
Dept: RADIOLOGY | Facility: CLINIC | Age: 62
End: 2022-05-16
Payer: COMMERCIAL

## 2022-05-16 DIAGNOSIS — Z87.81 S/P ORIF (OPEN REDUCTION INTERNAL FIXATION) FRACTURE: ICD-10-CM

## 2022-05-16 DIAGNOSIS — S72.402A CLOSED FRACTURE OF DISTAL END OF LEFT FEMUR, UNSPECIFIED FRACTURE MORPHOLOGY, INITIAL ENCOUNTER (HCC): Primary | ICD-10-CM

## 2022-05-16 DIAGNOSIS — S72.402A CLOSED FRACTURE OF DISTAL END OF LEFT FEMUR, UNSPECIFIED FRACTURE MORPHOLOGY, INITIAL ENCOUNTER (HCC): ICD-10-CM

## 2022-05-16 DIAGNOSIS — Z98.890 S/P ORIF (OPEN REDUCTION INTERNAL FIXATION) FRACTURE: ICD-10-CM

## 2022-05-16 PROCEDURE — 99024 POSTOP FOLLOW-UP VISIT: CPT | Performed by: ORTHOPAEDIC SURGERY

## 2022-05-16 PROCEDURE — 73552 X-RAY EXAM OF FEMUR 2/>: CPT

## 2022-05-16 RX ORDER — MORPHINE SULFATE 100 MG/1
100 TABLET ORAL EVERY 12 HOURS
COMMUNITY
Start: 2022-05-07

## 2022-05-16 RX ORDER — OXYCODONE AND ACETAMINOPHEN 10; 325 MG/1; MG/1
1 TABLET ORAL EVERY 6 HOURS PRN
COMMUNITY
Start: 2022-05-05

## 2022-05-16 NOTE — TELEPHONE ENCOUNTER
Patient ordered Home Health Aid  Jak Rayo said they cannot not take him they do not take [place] insurnace  Back on 3/29/22 SHASTA Nunez was consulted by another doctor but PCP would not sign orders so got refused  Jak Rayo said this may need to be send to auto for approval and who they want to use  Can someone send all info to [facility] for [facility]?

## 2022-05-16 NOTE — TELEPHONE ENCOUNTER
I faxed [facility] to [facility]  [facility] since patient does not live in [place]  3 4

## 2022-05-20 NOTE — TELEPHONE ENCOUNTER
Honey (no consent on file)  is calling in reference to home health for the patient  Honey states  ANNE-MARIE VEGAS advised that they do not come into [place] and that our office should be coordinating Andrei's home health  Please advise      Khushboo Mobley [phone]

## 2022-05-20 NOTE — TELEPHONE ENCOUNTER
We are aware patient cannot use [facility]  This was sent to his Auto Claim to set up for him since they have to make the decision where he can go  It was sent 4 days ago by our front staff

## 2022-05-20 NOTE — TELEPHONE ENCOUNTER
I called and LMOM for Maryam Lipoma explaining his Dede was sent the script because they have to decide where the patient can go  I advised if he does not here from them we can fax the information to them again  I advised there was a Honey that called but did not see consent on file with her name on it to speak to her concerning his care

## 2022-05-27 DIAGNOSIS — Z98.890 S/P ORIF (OPEN REDUCTION INTERNAL FIXATION) FRACTURE: ICD-10-CM

## 2022-05-27 DIAGNOSIS — S72.402A CLOSED FRACTURE OF DISTAL END OF LEFT FEMUR, UNSPECIFIED FRACTURE MORPHOLOGY, INITIAL ENCOUNTER (HCC): Primary | ICD-10-CM
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
## 2022-05-27 DIAGNOSIS — Z87.81 S/P ORIF (OPEN REDUCTION INTERNAL FIXATION) FRACTURE: ICD-10-CM

## 2022-05-27 NOTE — TELEPHONE ENCOUNTER
Honey contacted office asking for an update regarding home services  She statedin the message patient was supposed to be set up with a visiting nurse, physical therapy, and home health aid  Thew have no received any of these services yet  The other messages regarding this state for just a home health aid? Is this patient also to received visiting nurse services and physical therapy at home as well?

## 2022-05-31 NOTE — TELEPHONE ENCOUNTER
Herbert Ibarra @ [facility] called to speak to Emery Maloney sent that fax" she would not elaborate on why       C/b # [phone]    *Razia Mathis added this to the other encounter , my mistake
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
## 2022-06-01 NOTE — TELEPHONE ENCOUNTER
Rosana Gambino had sent the fax on 5/27/22 when I was out  I called Hoang Main to see what I can help her with

## 2022-06-02 NOTE — TELEPHONE ENCOUNTER
I spoke to wife I know you had helped with this in the past  She is gonna look into if there is a specific fax number to send this to the   But please see below

## 2022-06-15 NOTE — TELEPHONE ENCOUNTER
Snehal stated she faxed all information in so Jenny Mary could approve home health  I think they are asking for that Auth request form that is at    Can someone complete and fax in?

## 2022-06-15 NOTE — TELEPHONE ENCOUNTER
Roberth Encinas states they have not rec'd pre-cert for home health     She states this is needed:     [address] form with Dr's script to:     Fax: [phone]

## 2022-06-20 ENCOUNTER — APPOINTMENT (OUTPATIENT)
Dept: RADIOLOGY | Facility: CLINIC | Age: 62
End: 2022-06-20
Payer: COMMERCIAL

## 2022-06-20 DIAGNOSIS — Z72.0 DECLINED SMOKING CESSATION: ICD-10-CM

## 2022-06-20 DIAGNOSIS — S72.402A CLOSED FRACTURE OF DISTAL END OF LEFT FEMUR, UNSPECIFIED FRACTURE MORPHOLOGY, INITIAL ENCOUNTER (HCC): ICD-10-CM

## 2022-06-20 DIAGNOSIS — S72.402A CLOSED FRACTURE OF DISTAL END OF LEFT FEMUR, UNSPECIFIED FRACTURE MORPHOLOGY, INITIAL ENCOUNTER (HCC): Primary | ICD-10-CM

## 2022-06-20 PROCEDURE — 73552 X-RAY EXAM OF FEMUR 2/>: CPT

## 2022-06-20 PROCEDURE — 99213 OFFICE O/P EST LOW 20 MIN: CPT | Performed by: ORTHOPAEDIC SURGERY

## 2022-06-20 NOTE — PROGRESS NOTES
Assessment/Plan:  1  Closed fracture of distal end of left femur, unspecified fracture morphology, initial encounter ([facility] [address] )  XR femur 2 vw left   2  Declined smoking cessation         Scribe Attestation    I,:  Agustin Thmopson am acting as a scribe while in the presence of the attending physician :       I,:  Shanika Red MD personally performed the services described in this documentation    as scribed in my presence :           X-ray's were performed in the office and reviewed, stable appearance since previous post operative image in May  No obvious new bone formation since last visit  He was advised to continue nonweightbearing to left lower extremity  He may continue range of motion to tolerance with at-home physical therapy  At this point he has severe stiffness with contracture about the left knee  He has virtually no motion at the left knee with a 20 degree flexion contracture  The importance of smoking cessation was once again discussed  Patient will likely need a LTKA  He will follow-up in 1 month for repeat x-rays and evaluation of left femur  At this point, he does not require any further surgical intervention however we will need to watch him closely to see if he can create some new bone  We may consider a bone stimulator after next visit  I did stress to him the importance of smoking cessation  We spoke as well about calcium and vitamin-D intake  Subjective:   Alana Bach is a 64 y o  male who presents to the office today for follow-up regarding his left femur  He underwent ORIF left distal femur with Dr Sabrina Rodrigues on 03/16/2022  Maryam Lipoma has been nonweightbearing to left lower extremity ever since  He presents today in a stretcher  He has been immobilized in a knee immobilizer  He is currently residing at home and using a wheel chair  He states he has had at home health care come for range of motion physical therapy  He notes he still has pain in his knee         Review of Systems      Past Medical History:   Diagnosis Date    Chronic pain     Obesity        Past Surgical History:   Procedure Laterality Date    ORIF FEMUR FRACTURE Left 3/16/2022    Procedure: OPEN REDUCTION W/ INTERNAL FIXATION (ORIF) DISTAL FEMUR;  Surgeon: Jason Leon MD;  Location: [facility];  Service: Orthopedics    SPINE SURGERY         No family history on file  Social History     Occupational History    Not on file   Tobacco Use    Smoking status: Current Every Day Smoker     Packs/day: 2 00     Types: Cigarettes    Smokeless tobacco: Current User   Vaping Use    Vaping Use: Never used   Substance and Sexual Activity    Alcohol use: Not Currently    Drug use: Never    Sexual activity: Not on file         Current Outpatient Medications:     acetaminophen (TYLENOL) 325 mg tablet, Take 2 tablets (650 mg total) by mouth every 6 (six) hours as needed for mild pain, Disp: , Rfl: 0    enoxaparin (LOVENOX) 40 mg/0 4 mL, Inject 0 4 mL (40 mg total) under the skin daily in the early morning for 28 doses, Disp: 11 2 mL, Rfl: 0    famotidine (PEPCID) 40 MG tablet, Take 0 5 tablets (20 mg total) by mouth daily, Disp: , Rfl: 0    levothyroxine 150 mcg tablet, Take 150 mcg by mouth daily in the early morning, Disp: , Rfl:     loratadine-pseudoephedrine (CLARITIN-D 24-HOUR)  mg per 24 hr tablet, Take 1 tablet by mouth daily, Disp: , Rfl: 0    methocarbamol (ROBAXIN) 750 mg tablet, Take 1 tablet (750 mg total) by mouth every 6 (six) hours as needed for muscle spasms, Disp: 120 tablet, Rfl: 0    morphine (MS CONTIN) 100 mg 12 hr tablet, Take 100 mg by mouth every 12 (twelve) hours, Disp: , Rfl:     nicotine (NICODERM CQ) 21 mg/24 hr TD 24 hr patch, Place 1 patch on the skin daily, Disp: 28 patch, Rfl: 0    oxyCODONE-acetaminophen (PERCOCET)  mg per tablet, Take 1 tablet by mouth every 6 (six) hours as needed, Disp: , Rfl:     No Known Allergies    Objective:   There were no vitals filed for this visit  Right Knee Exam     Comments: Well healed anterior incision   Reduced edema noted  Patient is able to do a straight leg raise     His knee is stuck at 20 degrees of flexion              Physical Exam  Constitutional:       Appearance: Normal appearance  HENT:      Head: Normocephalic  Eyes:      Pupils: Pupils are equal, round, and reactive to light  Cardiovascular:      Pulses: Normal pulses  Pulmonary:      Effort: Pulmonary effort is normal    Musculoskeletal:      Comments: See HPI     Skin:     General: Skin is warm and dry  Neurological:      General: No focal deficit present  Mental Status: He is alert  Psychiatric:         Behavior: Behavior normal          I have personally reviewed pertinent films in PACS and my interpretation is as follows: stable alignment of left distal femoral shaft, fracture status post ORIF  No obvious new bone formation

## 2022-06-28 NOTE — PROGRESS NOTES
06/28/22 1051   Hello, [Guardians Name / Dao Palomares, this is [Caller Aby Espino from [facility], and our clinical care team wanted to check on you / your child after your recent visit to the hospital  It will only take 3-5 minutes  Is this a good time? Discharge Call Type/ Specific Diagnosis: ARC 90 Day Call   ARC 90 Day Discharge Call   Assessment Source 4   Call Complete for 90 Days call back? Left Message   Call Complete   Hi, This is ________ from [facility]  This is just a courtesy call, and there is no need to call us back  Have a great day     (Called by [address])
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
## (undated) DEVICE — ABDOMINAL PAD: Brand: DERMACEA

## (undated) DEVICE — SUT VICRYL PLUS 1 CTB-1 36 IN VCPB947H

## (undated) DEVICE — KNEE IMMOBILIZER: Brand: DEROYAL

## (undated) DEVICE — 4.3MM DRILL BIT/QC/180MM

## (undated) DEVICE — 1.6MM THREADED GUIDE WIRE 150MM

## (undated) DEVICE — 4.5MM CORTEX SCREW SELF-TAPPING 34MM: Type: IMPLANTABLE DEVICE | Status: NON-FUNCTIONAL

## (undated) DEVICE — WASHER 13.0MM: Type: IMPLANTABLE DEVICE | Site: LEG | Status: NON-FUNCTIONAL

## (undated) DEVICE — CHLORAPREP HI-LITE 26ML ORANGE

## (undated) DEVICE — 7.3MM CANNULATED CONICAL SCREW PARTIALLY THREADED 80MM: Type: IMPLANTABLE DEVICE | Site: LEG | Status: NON-FUNCTIONAL

## (undated) DEVICE — GLOVE SRG BIOGEL 8

## (undated) DEVICE — SPONGE PVP SCRUB WING STERILE

## (undated) DEVICE — 17.0MM CANN FLEXIBLE DRILL BIT LARGE QC/310MM

## (undated) DEVICE — SUT VICRYL PLUS 2-0 CTB-1 27 IN VCPB259H

## (undated) DEVICE — 7.3MM CANNULATED LOCKING SCREW 65MM: Type: IMPLANTABLE DEVICE | Site: LEG | Status: NON-FUNCTIONAL

## (undated) DEVICE — JP 3-SPRING RES W/10FR PVC DRAIN/TR: Brand: CARDINAL HEALTH

## (undated) DEVICE — DRAPE EQUIPMENT RF WAND

## (undated) DEVICE — PLUMEPEN PRO 10FT

## (undated) DEVICE — PAD GROUNDING ADULT

## (undated) DEVICE — DRAPE C-ARM X-RAY

## (undated) DEVICE — 4.5MM DRILL BIT/QC/145MM

## (undated) DEVICE — GAUZE SPONGES,16 PLY: Brand: CURITY

## (undated) DEVICE — SILVER-COATED ANTIMICROBIAL BARRIER DRESSING: Brand: ACTICOAT   4" X 8"

## (undated) DEVICE — GLOVE INDICATOR PI UNDERGLOVE SZ 8.5 BLUE

## (undated) DEVICE — PADDING CAST 4 IN  COTTON STRL

## (undated) DEVICE — U-DRAPE: Brand: CONVERTORS

## (undated) DEVICE — INTENDED FOR TISSUE SEPARATION, AND OTHER PROCEDURES THAT REQUIRE A SHARP SURGICAL BLADE TO PUNCTURE OR CUT.: Brand: BARD-PARKER SAFETY BLADES SIZE 10, STERILE

## (undated) DEVICE — PACK MAJOR ORTHO W/SPLITS PBDS

## (undated) DEVICE — 5.0MM CANNULATED LOCKING SCREW 40MM: Type: IMPLANTABLE DEVICE | Site: LEG | Status: NON-FUNCTIONAL

## (undated) DEVICE — THE SIMPULSE SOLO SYSTEM WITH ULTREX RETRACTABLE SPLASH SHIELD TIP: Brand: SIMPULSE SOLO

## (undated) DEVICE — 6.5MM CANNULATED SCREW 32MM THREAD 80MM: Type: IMPLANTABLE DEVICE | Site: LEG | Status: NON-FUNCTIONAL

## (undated) DEVICE — 2.5MM DRILL TIP GUIDE WIRE 200MM

## (undated) DEVICE — 2.8MM THREADED GUIDE WIRE- TROCAR POINT 300MM

## (undated) DEVICE — 6.5MM CANNULATED SCREW 16MM THREAD 75MM: Type: IMPLANTABLE DEVICE | Site: LEG | Status: NON-FUNCTIONAL

## (undated) DEVICE — STOCKINETTE REGULAR

## (undated) DEVICE — ACE WRAP 6 IN UNSTERILE